# Patient Record
(demographics unavailable — no encounter records)

---

## 2024-10-17 NOTE — ASSESSMENT
[FreeTextEntry1] : A/P: ----------------------- *HFpEF s/p cardiomems, -euvolemic on exam -Cont. bumex, metoprolol, jardiance, fiorella  -no entresto has had hypotension in the past.  -will also see HF team next month. -per [facility] cardiomems numbers WNL. ----------------------- *severe MR - s/p clip 11/2023 with persistent severe MR -not a candidate for reintervention at this time. ----------------------- *afib -cont. rate control anticoag. ----------------------- Return in 2 months needs close followup.

## 2024-10-17 NOTE — HISTORY OF PRESENT ILLNESS
[FreeTextEntry1] : [id number]  YUDELKA STATON  Sep  1946 [phone]   76 y/o F with *severe MR s/p MItraClip x2 (23; 1 Xtw 1 XTr) with residual severe MR *severe TR, *iatrogenic ASD  *HFpEF s/p cardiomems, *AFib   here for followup. No dyspnea, chest pain, syncope/lightheadness. Taking fiorella 25 mg daily w/o problems SBPs 110s.  Reviewed meds: -fiorella 25 mg daily -bumex 2mg tabs, one tab daily. -metoprolol succ 25 tabs - 1/2 tab at night. -jardiance 10 mg daily -eliquis 5 mg BID -dig 125 mcg daily  ========================== ========================== CARDIAC TESTING:  EC24 - afib, HR 94, low limb voltage, nl RWP Echo: TTE 23: EF 60%, LVEDd 4.7, mitral clip with Severe MR,  Severe TR, severe pHTN (PASP 110), severe biatrial enlargement,  atrial septal defect (likely iatrogenic) with L to R flow, IVC 2.5 cm (not collapsible)  TTE 23: EF 60%, LVEDd 3.6 Severely dilated LA,  Severely dilated RA, RV normal structure and function,  ASD present, Severe MR Severe TR RVSP 66  LISE 10/10/23: LVEF 60-65%, severe LAE, mild RA,  PASP 44mmHg, severe MR degenerative leaflets  with posterior (P2 scallop) leaflet, flail gap approx. 7.9mm, flail width approx 13.8 mm.  Echo 10/5/23: LVEF 60-65%, LVEDD 5.86cm, IVSd 0.94cm, PWd 1.01, severe TR, severe MR, normal RV function. Cardiac Cath/PCI: [facility]S 23: RA 8, RV 53/7, Pa 54/24/35, PCWP .  Pa sat 75 CO 10.8 CI 5.7 (may be overestimated due to shunt) [facility] 23: RA 3, RV 30/1, PA 32/13 (20), PCWP 9, PA sat 64.4%, CO/CI 4.62/2.49, PVR 2.38. [facility] 23: non obstructive CAD. Cardiac Sur23: EDMUND w/ Mitraclip x 2 (1 Xtw 1 XTr) ========================== ==========================

## 2024-10-17 NOTE — HISTORY OF PRESENT ILLNESS
[FreeTextEntry1] : [id number]  YUDELKA STATON  Sep  1946 [phone]   78 y/o F with *severe MR s/p MItraClip x2 (23; 1 Xtw 1 XTr) with residual severe MR *severe TR, *iatrogenic ASD  *HFpEF s/p cardiomems, *AFib   here for followup. No dyspnea, chest pain, syncope/lightheadness. Taking fiorella 25 mg daily w/o problems SBPs 110s.  Reviewed meds: -fiorella 25 mg daily -bumex 2mg tabs, one tab daily. -metoprolol succ 25 tabs - 1/2 tab at night. -jardiance 10 mg daily -eliquis 5 mg BID -dig 125 mcg daily  ========================== ========================== CARDIAC TESTING:  EC24 - afib, HR 94, low limb voltage, nl RWP Echo: TTE 23: EF 60%, LVEDd 4.7, mitral clip with Severe MR,  Severe TR, severe pHTN (PASP 110), severe biatrial enlargement,  atrial septal defect (likely iatrogenic) with L to R flow, IVC 2.5 cm (not collapsible)  TTE 23: EF 60%, LVEDd 3.6 Severely dilated LA,  Severely dilated RA, RV normal structure and function,  ASD present, Severe MR Severe TR RVSP 66  LISE 10/10/23: LVEF 60-65%, severe LAE, mild RA,  PASP 44mmHg, severe MR degenerative leaflets  with posterior (P2 scallop) leaflet, flail gap approx. 7.9mm, flail width approx 13.8 mm.  Echo 10/5/23: LVEF 60-65%, LVEDD 5.86cm, IVSd 0.94cm, PWd 1.01, severe TR, severe MR, normal RV function. Cardiac Cath/PCI: RHC/cMEMS 23: RA 8, RV 53/7, Pa 54/24/35, PCWP .  Pa sat 75 CO 10.8 CI 5.7 (may be overestimated due to shunt) [facility] 23: RA 3, RV 30/1, PA 32/13 (20), PCWP 9, PA sat 64.4%, CO/CI 4.62/2.49, PVR 2.38. [facility] 23: non obstructive CAD. Cardiac Sur23: EDMUND w/ Mitraclip x 2 (1 Xtw 1 XTr) ========================== ==========================

## 2024-10-31 NOTE — ASSESSMENT
[FreeTextEntry1] : Pt here for evaluation of CKD  was recently started on entresto, BP went down to 80s and " kidney" did not tolerate Never had kidney imaging done, did not have renal arteries  evaluated *severe MR s/p MItraClip x2 (11/17/23; 1 Xtw 1 XTr) with residual severe MR *severe TR, *iatrogenic ASD *HFpEF s/p cardiomems, *AFib  BP 90s to 100 range Weight stable 144 range Not on ARB. ACEi  on Jardiance 10 mg po qd Bumex 2 mg qd, if weight increases > 147 lb takes extra Bumex  CKD III stable 1.3 range now was > 2 mg/dl Will need duplex renal arteries to r/o CAMERON due to low BP and HOWIE on entresto

## 2024-10-31 NOTE — HISTORY OF PRESENT ILLNESS
[FreeTextEntry1] : Pt here for evaluation of CKD  was recently started on entresto, BP went down to 80s and " kidney" did not tolerate Never had kidney imaging done

## 2024-10-31 NOTE — PHYSICAL EXAM
[General Appearance - Alert] : alert [General Appearance - In No Acute Distress] : in no acute distress [Sclera] : the sclera and conjunctiva were normal [PERRL With Normal Accommodation] : pupils were equal in size, round, and reactive to light [Extraocular Movements] : extraocular movements were intact [Outer Ear] : the ears and nose were normal in appearance [Oropharynx] : the oropharynx was normal [Neck Appearance] : the appearance of the neck was normal [Neck Cervical Mass (___cm)] : no neck mass was observed [Jugular Venous Distention Increased] : there was no jugular-venous distention [Thyroid Diffuse Enlargement] : the thyroid was not enlarged [Thyroid Nodule] : there were no palpable thyroid nodules [Auscultation Breath Sounds / Voice Sounds] : lungs were clear to auscultation bilaterally [Heart Rate And Rhythm] : heart rate was normal and rhythm regular [Heart Sounds] : normal S1 and S2 [Heart Sounds Gallop] : no gallops [Murmurs] : no murmurs [Heart Sounds Pericardial Friction Rub] : no pericardial rub [Full Pulse] : the pedal pulses are present [Edema] : there was no peripheral edema [Bowel Sounds] : normal bowel sounds [Abdomen Soft] : soft [Abdomen Tenderness] : non-tender [Abdomen Mass (___ Cm)] : no abdominal mass palpated [Skin Color & Pigmentation] : normal skin color and pigmentation [Skin Turgor] : normal skin turgor [] : no rash [Deep Tendon Reflexes (DTR)] : deep tendon reflexes were 2+ and symmetric [Sensation] : the sensory exam was normal to light touch and pinprick [No Focal Deficits] : no focal deficits [Oriented To Time, Place, And Person] : oriented to person, place, and time [Impaired Insight] : insight and judgment were intact [Affect] : the affect was normal

## 2024-11-27 NOTE — SOCIAL HISTORY
[TextEntry] : Denies tobacco use. Occasional EtOH use (takes shot at night 2-3 times/week for sleep assistance). Lives alone. Previously .

## 2024-11-27 NOTE — ASSESSMENT
[FreeTextEntry1] : 78 yo F with h/o HFpEF (EF 60%, LVEDd 4.7) s/p CardioMEMs, severe MR s/p MItraClip x2 (11/17/23; 1 Xtw 1 XTr) with residual severe MR, AFib (on AC), severe TR, iatrogenic ASD who presents for follow up. She is ACC/AHA Stage C endorsing NYHA class II symptoms and is near euvolemic on exam    1. HFpEF   - Continue Bumex 2 mg po daily  - Continue Spironolactone 25 mg po daily  - Continue Jardiance 10 mg po daily  - had been symptomatically hypotensive with entresto, will keep off for now  - will monitor PA pressure via cardiomems, goal likely 22-24 - repeat echo prior to next visit   2. severe MR - s/p clip 11/2023 with persistent severe MR - plan as above - per structural, no plan for repat intervention at this time  3. severe TR - Severe TR  - symptoms have improved but will monitor with echos   4. Afib - poorly rate controlled - on Eliquis 5 mg twice/day - on toprol 12.5 mg po at bedtime - on dig 0.125 mg QOD   RTC PA in 2 months, Dr. Stephen in 4 months

## 2024-11-27 NOTE — PHYSICAL EXAM
[No Rub] : no rub [No Gallop] : no gallop [Edema ___] : edema [unfilled] [Normal] : alert and oriented, normal memory [de-identified] : frail elderly  [de-identified] : JVP elevated to ~ 10 with v waves  [de-identified] : JERILYN present  [de-identified] : irreg irreg rhythm; grade III/VI systolic murmur

## 2024-11-27 NOTE — HISTORY OF PRESENT ILLNESS
[FreeTextEntry1] : 79 yo F with h/o HFpEF (EF 60%, LVEDd 4.7) s/p CardioMEMs, severe MR s/p MItraClip x2 (11/17/23; 1 Xtw 1 XTr) with residual severe MR, AFib (on AC), severe TR, iatrogenic ASD who presents for follow up.  Please see office note form 5/17/24 for full hpi  She was last seen on 8/23/24 where she was asymptomatic with elevated PAD pressures and bumex was increased for several days with improvement.  SHe has felt well since then, but cardiomems PAD readings have remained around 30 (goal approx 22-24).  She is going to cardiac rehab and has significantly increased her activity, no longer experiencing dyspnea.  Her weight has remained [id number] lbs and SBPs of . She takes an additional 2 mg po of bumex if her weight is above 147, which she has not needed to for the past month. She denies any syncope, LH/dizziness, chest pain, palpitations, PND, orthopnea, nausea/vomiting, abdominal pain. Denies bendopnea but does get lightheaded.

## 2024-11-27 NOTE — CARDIOLOGY SUMMARY
[de-identified] : 1/30/24 - afib, HR 94, low limb voltage, nl RWP [de-identified] : TTE 6/4/24:  EF 63%, LVIDD 5.8 LA severely dilated, RA severely dilated, Severe intervalvular MR, Severe TR, PASP 71 IVC 2.9  TTE 12/22/23: EF 60%, LVEDd 4.7, mitral clip with Severe MR, Severe TR, severe pHTN (PASP 110), severe biatrial enlargement, atrial septal defect (likely iatrogenic) with L to R flow, IVC 2.5 cm (not collapsible)  TTE 12/12/23: EF 60%, LVEDd 3.6 Severely dilated LA, Severely dilated RA, RV normal structure and function, ASD present, Severe MR Severe TR RVSP 66  LISE 10/10/23: LVEF 60-65%, severe LAE, mild RA, PASP 44mmHg, severe MR degenerative leaflets with posterior (P2 scallop) leaflet, flail gap approx. 7.9mm, flail width approx 13.8 mm.    Echo 10/5/23: LVEF 60-65%, LVEDD 5.86cm, IVSd 0.94cm, PWd 1.01, severe TR, severe MR, normal RV function.  [de-identified] : [facility] RA 8, RV 53/7, Pa 54/24/35, PCWP 18/19/16. Pa sat 75 CO 10.8 CI 5.7 (may be overestimated due to shunt) [facility] 8/24/23: RA 3, RV 30/1, PA 32/13 (20), PCWP 9, PA sat 64.4%, CO/CI 4.62/2.49, PVR 2.38. [facility] 8/24/23: non obstructive CAD.  [de-identified] : 11/17/23: EDMUND w/ Mitraclip x 2 (1 Xtw 1 XTr)

## 2024-12-18 NOTE — HISTORY OF PRESENT ILLNESS
[FreeTextEntry1] : [id number] YUDELKA STATON Sep 5 1946 [phone]  76 y/o F with *severe MR s/p MItraClip x2 (23; 1 Xtw 1 XTr) with residual severe MR *severe TR, *iatrogenic ASD  *HFpEF s/p cardiomems, *AFib  here for followup No dyspnea, no palpitations, syncope/lightheadness. no chest pain. Going to cardiac rehab HF recent started for her. Doing 1 hr of sessions.  Can walk up 2 flights at steady pace. Does ADLs & chores around house.  Wts stable 142-144.  Reviewed meds: -bumex 2 mg tabs, pt takes 1 tab daily if wt goes up above 147   pt takes an extra tablet - occurs once a month. -jardiance 10 mg daily -metoprolol succ 12.5 mg daily -fiorella 25 daily -crestor 20 mg daily -dig 125 mcg daily  doing cardiac rehab paying for individual sessions. Reviewed cardiomems data w/ [facility] HF -  PA pressures values elevated but no upward trend. 27 24 31 ========================== ========================== CARDIAC TESTING:  EC24 - afib, HR 94, low limb voltage, nl RWP Echo: TTE 23: EF 60%, LVEDd 4.7, mitral clip with Severe MR, Severe TR, severe pHTN (PASP 110), severe biatrial enlargement, atrial septal defect (likely iatrogenic) with L to R flow, IVC 2.5 cm (not collapsible)  TTE 23: EF 60%, LVEDd 3.6 Severely dilated LA, Severely dilated RA, RV normal structure and function, ASD present, Severe MR Severe TR RVSP 66  LISE 10/10/23: LVEF 60-65%, severe LAE, mild RA, PASP 44mmHg, severe MR degenerative leaflets with posterior (P2 scallop) leaflet, flail gap approx. 7.9mm, flail width approx 13.8 mm.  Echo 10/5/23: LVEF 60-65%, LVEDD 5.86cm, IVSd 0.94cm, PWd 1.01,  severe TR, severe MR, normal RV function. Cardiac Cath/PCI: [facility] 23: RA 8, RV 53/7, Pa 54/24/35, PCWP 18/19/16. Pa sat 75 CO 10.8 CI 5.7 (may be overestimated due to shunt) [facility] 23: RA 3, RV 30/1, PA 32/13 (20), PCWP 9, PA sat 64.4%, CO/CI 4.62/2.49, PVR 2.38. [facility] 23: non obstructive CAD. Cardiac Sur23: EDMUND w/ Mitraclip x 2 (1 Xtw 1 XTr)  BNPs: Sep '24: 2998 :  2229 : 3192 Mar '24: 3515 Sep '24: 1414  Cr/CGF: 1.32/ 41 Sep '24,  2.03/,  1.52/ 35  ========================== ==========================

## 2025-01-06 NOTE — ASSESSMENT
[FreeTextEntry1] : Pt here for evaluation of CKD  was recently started on entresto, BP went down to 80s and " kidney" did not tolerate Never had kidney imaging done  1/6 Feels improved on current meds  BNP elevated in Sept 2024 Takes Bumex with weight gain and sxs  severe MR s/p MItraClip x2 (11/17/23; 1 Xtw 1 XTr) with residual severe MR severe TR, iatrogenic ASD HFpEF s/p cardiomems, AFib  CKD III pt need to be kept in a slight negative balance, and elevated creat Pt able to monitor fluid balance, by sxs, weight and swelling of legs Renal duplex w/o any renal or renal artery stenosis Cont with the same meds Weight stable 146 lds on our office scale Creat 1.5 mg/dl which is stable at this hydration status no edema

## 2025-01-06 NOTE — HISTORY OF PRESENT ILLNESS
[FreeTextEntry1] : Pt here for evaluation of CKD  was recently started on entresto, BP went down to 80s and " kidney" did not tolerate Never had kidney imaging done  1/6 Feels improved on current meds  BNP elevated in Sept 2024 Takes Bumex with weight gain and sxs

## 2025-02-04 NOTE — PHYSICAL EXAM
[No Rub] : no rub [No Gallop] : no gallop [Edema ___] : edema [unfilled] [Normal] : alert and oriented, normal memory [No Edema] : no edema [de-identified] : frail elderly  [de-identified] : JVP elevated to ~ 8-10 with v waves  [de-identified] : JERILYN present  [de-identified] : irreg irreg rhythm; grade III/VI systolic murmur in L axilla

## 2025-02-04 NOTE — HISTORY OF PRESENT ILLNESS
[FreeTextEntry1] : Ms. Dye is a 77 yo F with h/o HFpEF (EF 60%, LVEDd 4.7) s/p CardioMEMs, severe MR s/p MItraClip x2 (11/17/23; 1 Xtw 1 XTr) with residual severe MR, AFib (on AC), severe TR, iatrogenic ASD who presents for follow up.  Please see office note form 5/17/24 for full iniitial details.   She was last seen on 11/22/24 where she was asymptomatic with elevated PAD pressures but is walking more than she has in a very long time without symptoms.  She is going to cardiac rehab and has significantly increased her activity, no longer experiencing dyspnea. Feels tired throughout the day with daytime naps. Her weight has remained [id number] lbs and SBPs of .   She takes an additional 2 mg po of bumex if her weight is above 147, which she has taken occasionally. She denies any syncope, LH/dizziness, chest pain, palpitations, PND, orthopnea, nausea/vomiting, abdominal pain. Denies bendopnea but does get lightheaded. Uses cane for stability.

## 2025-02-04 NOTE — ASSESSMENT
[FreeTextEntry1] : Ms. Dye is a 79 yo F with h/o HFpEF (EF 60%, LVEDd 4.7) s/p CardioMEMs, severe MR s/p MItraClip x2 (11/17/23; 1 Xtw 1 XTr) with residual severe MR, AFib (on AC), severe TR, iatrogenic ASD who presents for follow up. She is ACC/AHA Stage C endorsing NYHA class II symptoms and is near euvolemic on exam    1. HFpEF   - Continue Bumex 2 mg po daily  - Continue Spironolactone 25 mg po daily  - Continue Jardiance 10 mg po daily  - had been symptomatically hypotensive with entresto; will trial losartan 12.5 mg qhs - will monitor PA pressure via cardiomems, goal likely 22-24 - repeat echo prior to next visit   2. severe MR - s/p clip 11/2023 with persistent severe MR - plan as above - per structural, no plan for repat intervention at this time  3. severe TR - Severe TR  - symptoms have improved but will monitor with echos   4. Afib - poorly rate controlled - on Eliquis 5 mg twice/day - on toprol 12.5 mg po at bedtime - on dig 0.125 mg QOD   RTC PA in 2 and 4 months, me in 6 months

## 2025-02-04 NOTE — CARDIOLOGY SUMMARY
[de-identified] : 1/30/24 - afib, HR 94, low limb voltage, nl RWP [de-identified] : TTE 6/4/24:  EF 63%, LVIDD 5.8 LA severely dilated, RA severely dilated, Severe intervalvular MR, Severe TR, PASP 71 IVC 2.9  TTE 12/22/23: EF 60%, LVEDd 4.7, mitral clip with Severe MR, Severe TR, severe pHTN (PASP 110), severe biatrial enlargement, atrial septal defect (likely iatrogenic) with L to R flow, IVC 2.5 cm (not collapsible)  TTE 12/12/23: EF 60%, LVEDd 3.6 Severely dilated LA, Severely dilated RA, RV normal structure and function, ASD present, Severe MR Severe TR RVSP 66  LISE 10/10/23: LVEF 60-65%, severe LAE, mild RA, PASP 44mmHg, severe MR degenerative leaflets with posterior (P2 scallop) leaflet, flail gap approx. 7.9mm, flail width approx 13.8 mm.    Echo 10/5/23: LVEF 60-65%, LVEDD 5.86cm, IVSd 0.94cm, PWd 1.01, severe TR, severe MR, normal RV function.  [de-identified] [facility] RA 8, RV 53/7, Pa 54/24/35, PCWP 18/19/16. Pa sat 75 CO 10.8 CI 5.7 (may be overestimated due to shunt) [facility] 8/24/23: RA 3, RV 30/1, PA 32/13 (20), PCWP 9, PA sat 64.4%, CO/CI 4.62/2.49, PVR 2.38. [facility] 8/24/23: non obstructive CAD.  [de-identified] : 11/17/23: EDMUND w/ Mitraclip x 2 (1 Xtw 1 XTr)

## 2025-02-27 NOTE — HISTORY OF PRESENT ILLNESS
[FreeTextEntry1] : [id number]  YUDELKA STATON  Sep  5 1946 [phone]     76 y/o F with *severe MR s/p MItraClip x2 (11/17/23; 1 Xtw 1 XTr) with residual severe MR *severe TR, *iatrogenic ASD  *HFpEF s/p cardiomems, *AFib  BPs 90s-890s /60s at home. pt states she has to "work at getting BP up" went to cardiac rehab BPs 80s => drank fluid deep breathing this improves. increases to 96 so can do exercise but at end of exercise pt is quiviering during exercise not paying attention not feeling lightheaded after  last exericse almost passed out.  Started on losartan 12.5 mg qhs no dyspnea, no palptiations, no chest pain down to 143 feb 1st feb 2nd 144 147 yesterday 147 in past week not eating anyting   labs reviewed mid feb ordered by Dr. Stephen WNL A/P:  -stop losartan due to symtpomatic hyptoesnion -cont. other meds   Return May '25

## 2025-02-27 NOTE — HISTORY OF PRESENT ILLNESS
[FreeTextEntry1] : [id number]  YUDELKA STATON  Sep  5 1946 [phone]     76 y/o F with *severe MR s/p MItraClip x2 (11/17/23; 1 Xtw 1 XTr) with residual severe MR *severe TR, *iatrogenic ASD  *HFpEF s/p cardiomems, *AFib  BPs 90s-890s /60s at home. pt states she has to "work at getting BP up" went to cardiac rehab BPs 80s => drank fluid deep breathing this improves. increases to 96 so can do exercise but at end of exercise pt is quiviering during exercise not paying attention not feeling lightheaded after  last exericse almost passed out.  Started on losartan 12.5 mg qhs no dyspnea, no palptiations, no chest pain down to 143 feb 1st feb 2nd 144 147 yesterday 147 in past week not eating anyting   labs reviewed mid feb ordered by Dr. Stephen WNL A/P:  -stop losartan due to symtpomatic hyptoesnion -cont. other meds   Return May '25

## 2025-03-08 NOTE — HISTORY OF PRESENT ILLNESS
[FreeTextEntry1] : 75yo female wit CHF, HTN, HLD with new onset afib and severe MR recent hospital admission during which she underwent a cardiac catheterization. She had right CFA access for the procedure. Following procedure had right groin pain and bruising. Had a duplex that was concerning for PSA. Since discharge she has been doing well denies current groin pain, denies swelling in the RLE, denies foot numbness and pain  Interval History: Todat patient seen for follow up of AVF between right common femoral artery and common femoral vein with office duplex. She denies any worsening heart failure symptoms including SOB. She endorses being able to lay flat. She denies bilateral lower extremity swelling or bulging. States she has SOB when going up a flight of stairs which is not new. She denies pain bilaterally.  Interval History: Doing well no new leg complaints. Legs (bilateral) still with swelling. Not worsened but persistent. No claudication no worsening in symptoms. Compliant with all meds and follow ups. [de-identified] : 3/6/25: Presenting today for arterial duplex for AVF between right common femoral artery and common femoral vein following CFA access during cardiac catheterization. Patient states the swelling in her leg has significantly improved

## 2025-03-08 NOTE — HISTORY OF PRESENT ILLNESS
[FreeTextEntry1] : 77yo female wit CHF, HTN, HLD with new onset afib and severe MR recent hospital admission during which she underwent a cardiac catheterization. She had right CFA access for the procedure. Following procedure had right groin pain and bruising. Had a duplex that was concerning for PSA. Since discharge she has been doing well denies current groin pain, denies swelling in the RLE, denies foot numbness and pain  Interval History: Todat patient seen for follow up of AVF between right common femoral artery and common femoral vein with office duplex. She denies any worsening heart failure symptoms including SOB. She endorses being able to lay flat. She denies bilateral lower extremity swelling or bulging. States she has SOB when going up a flight of stairs which is not new. She denies pain bilaterally.  Interval History: Doing well no new leg complaints. Legs (bilateral) still with swelling. Not worsened but persistent. No claudication no worsening in symptoms. Compliant with all meds and follow ups. [de-identified] : 3/6/25: Presenting today for arterial duplex for AVF between right common femoral artery and common femoral vein following CFA access during cardiac catheterization. Patient states the swelling in her leg has significantly improved

## 2025-03-20 NOTE — HISTORY OF PRESENT ILLNESS
[FreeTextEntry1] : Pt here for evaluation of CKD  was recently started on entresto, BP went down to 80s and " kidney" did not tolerate Never had kidney imaging done  1/6 Feels improved on current meds  BNP elevated in Sept 2024 Takes Bumex with weight gain and sxs  3/20 Had SVT evaluated by Cardiology [facility] no complaints BP stable at home

## 2025-03-20 NOTE — ASSESSMENT
[FreeTextEntry1] : Pt here for evaluation of CKD  was recently started on entresto, BP went down to 80s and " kidney" did not tolerate Never had kidney imaging done  1/6 Feels improved on current meds  BNP elevated in Sept 2024 Takes Bumex with weight gain and sxs  3/20 Had SVT evaluated by Cardiology [facility] no complaints BP stable at home CKD III last Creat at  3/11 was 1.2 , possibly primary hyperparathyroidism Ca levels stable high 9 to 10 Pt declining endocrine eval If becomes problematic may get endocrine consult/ sistamibe scan  and if calcium further elevated may Rx cinacalcet  BP/ weights stable f/u in 6 months

## 2025-03-21 NOTE — PHYSICAL EXAM
[No Rub] : no rub [No Gallop] : no gallop [No Edema] : no edema [Normal] : alert and oriented, normal memory [de-identified] : frail elderly  [de-identified] : JVP elevated to ~ 8-10 with v waves  [de-identified] : JERILYN present  [de-identified] : irreg irreg rhythm; grade III/VI systolic murmur in L axilla

## 2025-03-21 NOTE — ASSESSMENT
[FreeTextEntry1] : Ms. Dye is a 77 yo F with h/o HFpEF (EF 60%, LVEDd 4.7) s/p CardioMEMs, severe MR s/p MItraClip x2 (11/17/23; 1 Xtw 1 XTr) with residual severe MR, AFib (on AC), severe TR, iatrogenic ASD who presents for follow up. She is ACC/AHA Stage C endorsing NYHA class II symptoms and is near euvolemic on exam    1. HFpEF   - Continue Bumex 2 mg po daily  - Continue Spironolactone 25 mg po daily  - Continue Jardiance 10 mg po daily  - had been symptomatically hypotensive with entresto and losartan  - will monitor PA pressure via cardiomems, goal likely 22-24   2. severe MR - s/p clip 11/2023 with persistent severe MR - plan as above - per structural, no plan for repat intervention at this time  3. severe TR - Severe TR  - symptoms have improved but will monitor with echos   4. Afib - poorly rate controlled - on Eliquis 5 mg twice/day - on toprol 12.5 mg po at bedtime - on dig 0.125 mg QOD   5. VT - S/p ICD   RTC PA in 2 months,  Dr. Stephen

## 2025-03-21 NOTE — ASSESSMENT
[FreeTextEntry1] : Today ILR reveals she is in Afib, rates controlled. She is maintained on Eliquis, Digoxin, Toprol XL12.5mg daily. No episodes of NSVT or any other recordings noted. Remote is transmitting.

## 2025-03-21 NOTE — ASSESSMENT
[FreeTextEntry1] : A/P:  -exercise stress will be scheduled next week to confirm no significant arrhythmias. after than will resume cardiac rehab.

## 2025-03-21 NOTE — HISTORY OF PRESENT ILLNESS
[FreeTextEntry1] : Ms. Dye is a 77 yo F with h/o HFpEF (EF 60%, LVEDd 4.7) s/p CardioMEMs, severe MR s/p MItraClip x2 (11/17/23; 1 Xtw 1 XTr) with residual severe MR, AFib (on AC), severe TR, iatrogenic ASD who presents for follow up.  Please see office note form 5/17/24 for full iniitial details.   She was last seen on 2/4/25 feeling well, was started on losartan for afterload reduction.  Unfortunately this decreased her BPs to 80s with lightheadedness.  She was admitted to  2 weeks ago after an episode of 30 bts of vt while at cardiac rehab, requiring ICD placement. She has been asymptotic since and is schedule to undergo a stress test today. PAD has remained elevated but her activity in increasing daily.   Her weight has remained [id number] lbs and SBPs of .   She takes an additional 2 mg po of bumex if her weight is above 147, which she has taken occasionally. She denies any syncope, LH/dizziness, chest pain, palpitations, PND, orthopnea, nausea/vomiting, abdominal pain. Denies bendopnea but does get lightheaded. Uses cane for stability.

## 2025-03-21 NOTE — HISTORY OF PRESENT ILLNESS
[FreeTextEntry1] : 76 y/o F with  *NSVT-30 beats-s/p ILR Mar '25 *severe MR s/p MItraClip x2 (23; 1 Xtw 1 XTr) with residual severe MR *severe TR, *iatrogenic ASD   *HFpEF s/p cardiomems, *AFib  here for followup. last visit  losartan stopped for symptomatic hypotension.  Since last visit had 30 beats of NSVt mildly symptomatic at cardiac rehab sent to ED cath w/ normal cors ILR implanted see below for records  Reviewed case w/ Dr. Makeda PIEDRA. nonsustained VT run of 30 beats was not considered to be related to exercise as pt was doing minimal exertion at time of event.    ========================================== PRIOR CARDIAC TESTING:  EC24 - afib, HR 94, low limb voltage, nl RWP Echo: TTE 24: EF 63%, LVIDD 5.8 LA severely dilated, RA severely dilated, Severe intervalvular MR, Severe TR, PASP 71 IVC 2.9  TTE 23: EF 60%, LVEDd 4.7, mitral clip with Severe MR, Severe TR, severe pHTN (PASP 110), severe biatrial enlargement, atrial septal defect (likely iatrogenic) with L to R flow, IVC 2.5 cm (not collapsible)  TTE 23: EF 60%, LVEDd 3.6 Severely dilated LA, Severely dilated RA, RV normal structure and function, ASD present, Severe MR Severe TR RVSP 66  LISE 10/10/23: LVEF 60-65%, severe LAE, mild RA, PASP 44mmHg, severe MR degenerative leaflets with posterior (P2 scallop) leaflet, flail gap approx. 7.9mm, flail width approx 13.8 mm.  Echo 10/5/23: LVEF 60-65%, LVEDD 5.86cm, IVSd 0.94cm, PWd 1.01, severe TR, severe MR, normal RV function.  Cardiac Cath/PCI: RHC/cMEMS 23: RA 8, RV 53/7, Pa 54/24/35,  PCWP 18/19/16. Pa sat 75 CO 10.8 CI 5.7 (may be overestimated due to shunt) [facility] 23: RA 3, RV 30/1, PA 32/13 (20), PCWP 9, PA sat 64.4%, CO/CI 4.62/2.49, PVR 2.38.  [facility] 23: non obstructive CAD. *********Cath 03/10/2025: normal coronaries,  hypercontractile LV w/ severe MR  Cardiac Sur23: EDMUND w/ Mitraclip x 2 (1 Xtw 1 XTr) ========================================= =========================================  Hospital Course: Discharge Date 11-Mar-2025 Admission Date 07-Mar-2025 16:19 Hospital Course .................................... 76 y/o F with HFpEF, severe MR, AFib now s/p EDMUND with MitraClip on 23, CKD. Presenting to  for 30 beats of vtach. Pt states she was at cardiac rehab exercising, pt felt SOB and states vtach was detected at this time. PT has no other symptoms denies chest pain, fevers, chills. In the ED pt reports that she felt dizziness and presyncope during rehab episode however she denies these symptoms with me . VSS, LAbs WNL. Admitted to Tele Assessment/Plan:  # Nonsustained Vtach  - Tele - Recent TTE : LVEF 70%, severely dilated RA and LA, severe MR s/p mitral clip, severe TR, severe pHTN, PFO with left to right shunt - [facility] 3/10 - cw BB - EP and cardio consulted neg cath does not qualify for AICD as NSVT was for only 15 sec ( needs more than 30 sec) s/p ILR 3/11/25  #HFpEF and valvular heat disease  - Daily weights I&Os - GDMT, holding entresto as patient states this was held by Dr Mendoza normal Dig level  # CKD: increased cr levels held bumex, dig; reume upon discharge  # Chronic Afib - cont eliquis on BB  Code status: Full  d/c home  time spent 45 min  Med Reconciliation: Medication Reconciliation Status Admission Reconciliation is Completed Discharge Reconciliation is Completed Discharge Medications   *apixaban 5 mg oral tablet: 1 tab(s) orally every 12 hours *bumetanide 2 mg oral tablet: 2 tab(s) orally once a day Centrum Silver Women's oral tablet: 1 tab(s) orally once a day *digoxin 125 mcg (0.125 mg) oral tablet: 1 tab(s) orally every other day *** M, W, F*** *ferrous sulfate 325 mg (65 mg elemental iron) oral tablet: 1 tab(s) orally once a day *Metoprolol Succinate ER 25 mg oral tablet, extended release: 0.5 tab(s) orally once a day (at bedtime) *rosuvastatin 20 mg oral tablet: 1 tab(s) orally once a day *sacubitril-valsartan 24 mg-26 mg oral tablet: 0.5 tab(s) orally 2 times a day *spironolactone 25 mg oral tablet: 1 tab(s) orally once a day ....................................  Electronic Signatures: Oscar Barrett) (Signed 11-Mar-2025 15:54) ========================================= ========================================= Progress Note: - Provider Specialty Electrophysiology  Reason for Admission: Reason for Admission: - Reason for Admission VT at Cardiac Rehab   - Subjective and Objective: 76 y/o F with HFpEF, severe MR, AFib now s/p EDMUND with MitraClip on 23, CKD. Presenting to  for 30 beats of vtach. Pt states she was at cardiac rehab exercising, pt felt SOB and states vtach was detected at this time. PT has no other symptoms denies chest pain, fevers, chills. In the ED pt reports that she felt dizziness and presyncope during rehab episode however she denies these symptoms with me . VSS, LAbs WNL. Admitted to [facility] < from: TTE W or WO Ultrasound Enhancing Agent (25 @ 12:55) >  CONCLUSIONS:  1. Left ventricular systolic function is normal with an ejection fractionof 70 % by Wiggins's method of disks. 2. Left atrium is severely dilated. 3. The right atrium is severely dilated. 4. Severe mitral regurgitation. 5. Patent foramen ovale with left to right shunting by color flow Doppler. 6. Severe tricuspid regurgitation. 7. Estimated pulmonary artery systolic pressure is 80 mmHg, consistent with severe pulmonary hypertension. 8. Percutaneous [id number] mitral repair device with a Mitraclip.  Assessment and Plan: - Assessment 77 year old female withnon-sustainedVT at Cardiac Rehab. LVEF is 70% with severe MR and TR Pat Med Hx of: HFpEF, severe MR, AFib now s/p EDMUND with MitraClip on 23, CKD. Pt to have LHC today. If cath is negative will proceed for ICD for secondary prevention.  Will make pt NPO Type and Screen today NPO after midnight Will hold Eliquis if the plan is to proceed with ICD, if no cardiac interventions  Electronic Signatures: Magaly Barrera (NP) (Signed 11-Mar-2025 10:09) ========================================= ========================================= Authored: Progress Note, Reason for Admission, Subjective and Objective, Assessment and Plan  Note Type Electrophysiology   Pt had LHC that showed nonobstructive disease and recommended medial management. Pt remains in SR. AT home was in Torpol 12.5mg daily. Tel shows heart rate averaging 60 BPM over the the last 24 hours. /71 I received the entire strips for [facility]-VT totalled 15 seconds. Pt doesn't meet the requirements for ICD, will continue with her current dose of Toprol 12.5 mgs daily Will implant ILR in the am.   Electronic Signatures: Magaly Barrera (RONALD) (Signed 10-Mar-2025 17:28) Authored: Note Type, Note   Last Updated: 10-Mar-2025 17:28 by Magaly Barrera (NP) ========================================= ========================================= Cath Lab Report  Study Date:03/10/2025 Name:YUDELKA STATON Diagnostic Conclusions:No coronary disease.  Hypercontractile LV with severe MR LM, LAD LCx RCA  no disease. ========================================= =========================================

## 2025-03-21 NOTE — HISTORY OF PRESENT ILLNESS
[FreeTextEntry1] : This is a 78 yr old female with PMHx of HFpEF, severe MR s/p Beverly clip with residual severe MR, severe TR, also has Cardiomems, iatrogenic ASD, PAF on OAC and Digoxin who had episode of VT 30 beats during cardiac rehab a few weeks ago with symptoms of lightheadedness, SOB, and near syncope.  She was admitted to  and underwent LHC/RHC, normal cors.  She had ILR implanted for further rhythm surveillance.

## 2025-03-21 NOTE — HISTORY OF PRESENT ILLNESS
[FreeTextEntry1] : 76 y/o F with  *NSVT-30 beats-s/p ILR Mar '25 *severe MR s/p MItraClip x2 (23; 1 Xtw 1 XTr) with residual severe MR *severe TR, *iatrogenic ASD   *HFpEF s/p cardiomems, *AFib  here for followup. last visit  losartan stopped for symptomatic hypotension.  Since last visit had 30 beats of NSVt mildly symptomatic at cardiac rehab sent to ED cath w/ normal cors ILR implanted see below for records  Reviewed case w/ Dr. Makeda PIEDRA. nonsustained VT run of 30 beats was not considered to be related to exercise as pt was doing minimal exertion at time of event.    ========================================== PRIOR CARDIAC TESTING:  EC24 - afib, HR 94, low limb voltage, nl RWP Echo: TTE 24: EF 63%, LVIDD 5.8 LA severely dilated, RA severely dilated, Severe intervalvular MR, Severe TR, PASP 71 IVC 2.9  TTE 23: EF 60%, LVEDd 4.7, mitral clip with Severe MR, Severe TR, severe pHTN (PASP 110), severe biatrial enlargement, atrial septal defect (likely iatrogenic) with L to R flow, IVC 2.5 cm (not collapsible)  TTE 23: EF 60%, LVEDd 3.6 Severely dilated LA, Severely dilated RA, RV normal structure and function, ASD present, Severe MR Severe TR RVSP 66  LISE 10/10/23: LVEF 60-65%, severe LAE, mild RA, PASP 44mmHg, severe MR degenerative leaflets with posterior (P2 scallop) leaflet, flail gap approx. 7.9mm, flail width approx 13.8 mm.  Echo 10/5/23: LVEF 60-65%, LVEDD 5.86cm, IVSd 0.94cm, PWd 1.01, severe TR, severe MR, normal RV function.  Cardiac Cath/PCI: RHC/cMEMS 23: RA 8, RV 53/7, Pa 54/24/35,  PCWP 18/19/16. Pa sat 75 CO 10.8 CI 5.7 (may be overestimated due to shunt) [facility] 23: RA 3, RV 30/1, PA 32/13 (20), PCWP 9, PA sat 64.4%, CO/CI 4.62/2.49, PVR 2.38.  [facility] 23: non obstructive CAD. *********Cath 03/10/2025: normal coronaries,  hypercontractile LV w/ severe MR  Cardiac Sur23: EDMUND w/ Mitraclip x 2 (1 Xtw 1 XTr) ========================================= =========================================  Hospital Course: Discharge Date 11-Mar-2025 Admission Date 07-Mar-2025 16:19 Hospital Course .................................... 76 y/o F with HFpEF, severe MR, AFib now s/p EDMUND with MitraClip on 23, CKD. Presenting to  for 30 beats of vtach. Pt states she was at cardiac rehab exercising, pt felt SOB and states vtach was detected at this time. PT has no other symptoms denies chest pain, fevers, chills. In the ED pt reports that she felt dizziness and presyncope during rehab episode however she denies these symptoms with me . VSS, LAbs WNL. Admitted to Tele Assessment/Plan:  # Nonsustained Vtach  - Tele - Recent TTE : LVEF 70%, severely dilated RA and LA, severe MR s/p mitral clip, severe TR, severe pHTN, PFO with left to right shunt - [facility] 3/10 - cw BB - EP and cardio consulted neg cath does not qualify for AICD as NSVT was for only 15 sec ( needs more than 30 sec) s/p ILR 3/11/25  #HFpEF and valvular heat disease  - Daily weights I&Os - GDMT, holding entresto as patient states this was held by Dr Mendoza normal Dig level  # CKD: increased cr levels held bumex, dig; reume upon discharge  # Chronic Afib - cont eliquis on BB  Code status: Full  d/c home  time spent 45 min  Med Reconciliation: Medication Reconciliation Status Admission Reconciliation is Completed Discharge Reconciliation is Completed Discharge Medications   *apixaban 5 mg oral tablet: 1 tab(s) orally every 12 hours *bumetanide 2 mg oral tablet: 2 tab(s) orally once a day Centrum Silver Women's oral tablet: 1 tab(s) orally once a day *digoxin 125 mcg (0.125 mg) oral tablet: 1 tab(s) orally every other day *** M, W, F*** *ferrous sulfate 325 mg (65 mg elemental iron) oral tablet: 1 tab(s) orally once a day *Metoprolol Succinate ER 25 mg oral tablet, extended release: 0.5 tab(s) orally once a day (at bedtime) *rosuvastatin 20 mg oral tablet: 1 tab(s) orally once a day *sacubitril-valsartan 24 mg-26 mg oral tablet: 0.5 tab(s) orally 2 times a day *spironolactone 25 mg oral tablet: 1 tab(s) orally once a day ....................................  Electronic Signatures: Oscar Barrett) (Signed 11-Mar-2025 15:54) ========================================= ========================================= Progress Note: - Provider Specialty Electrophysiology  Reason for Admission: Reason for Admission: - Reason for Admission VT at Cardiac Rehab   - Subjective and Objective: 76 y/o F with HFpEF, severe MR, AFib now s/p EDMUND with MitraClip on 23, CKD. Presenting to  for 30 beats of vtach. Pt states she was at cardiac rehab exercising, pt felt SOB and states vtach was detected at this time. PT has no other symptoms denies chest pain, fevers, chills. In the ED pt reports that she felt dizziness and presyncope during rehab episode however she denies these symptoms with me . VSS, LAbs WNL. Admitted to [facility] < from: TTE W or WO Ultrasound Enhancing Agent (25 @ 12:55) >  CONCLUSIONS:  1. Left ventricular systolic function is normal with an ejection fractionof 70 % by Wiggins's method of disks. 2. Left atrium is severely dilated. 3. The right atrium is severely dilated. 4. Severe mitral regurgitation. 5. Patent foramen ovale with left to right shunting by color flow Doppler. 6. Severe tricuspid regurgitation. 7. Estimated pulmonary artery systolic pressure is 80 mmHg, consistent with severe pulmonary hypertension. 8. Percutaneous [id number] mitral repair device with a Mitraclip.  Assessment and Plan: - Assessment 77 year old female withnon-sustainedVT at Cardiac Rehab. LVEF is 70% with severe MR and TR Pat Med Hx of: HFpEF, severe MR, AFib now s/p EDMUND with MitraClip on 23, CKD. Pt to have LHC today. If cath is negative will proceed for ICD for secondary prevention.  Will make pt NPO Type and Screen today NPO after midnight Will hold Eliquis if the plan is to proceed with ICD, if no cardiac interventions  Electronic Signatures: Magaly Barrera (NP) (Signed 11-Mar-2025 10:09) ========================================= ========================================= Authored: Progress Note, Reason for Admission, Subjective and Objective, Assessment and Plan  Note Type Electrophysiology   Pt had LHC that showed nonobstructive disease and recommended medial management. Pt remains in SR. AT home was in Torpol 12.5mg daily. Tel shows heart rate averaging 60 BPM over the the last 24 hours. /71 I received the entire strips for [facility]-VT totalled 15 seconds. Pt doesn't meet the requirements for ICD, will continue with her current dose of Toprol 12.5 mgs daily Will implant ILR in the am.   Electronic Signatures: Magaly Barrera (RONALD) (Signed 10-Mar-2025 17:28) Authored: Note Type, Note   Last Updated: 10-Mar-2025 17:28 by Magaly Barrera (NP) ========================================= ========================================= Cath Lab Report  Study Date:03/10/2025 Name:YUDELKA STATON Diagnostic Conclusions:No coronary disease.  Hypercontractile LV with severe MR LM, LAD LCx RCA  no disease. ========================================= =========================================

## 2025-03-21 NOTE — HISTORY OF PRESENT ILLNESS
[FreeTextEntry1] : 76 y/o F with  *NSVT-30 beats-s/p ILR Mar '25 *severe MR s/p MItraClip x2 (23; 1 Xtw 1 XTr) with residual severe MR *severe TR, *iatrogenic ASD   *HFpEF s/p cardiomems, *AFib  here for followup. last visit  losartan stopped for symptomatic hypotension.  Since last visit had 30 beats of NSVt mildly symptomatic at cardiac rehab sent to ED cath w/ normal cors ILR implanted see below for records  Reviewed case w/ Dr. Makeda PIEDRA. nonsustained VT run of 30 beats was not considered to be related to exercise as pt was doing minimal exertion at time of event.    ========================================== PRIOR CARDIAC TESTING:  EC24 - afib, HR 94, low limb voltage, nl RWP Echo: TTE 24: EF 63%, LVIDD 5.8 LA severely dilated, RA severely dilated, Severe intervalvular MR, Severe TR, PASP 71 IVC 2.9  TTE 23: EF 60%, LVEDd 4.7, mitral clip with Severe MR, Severe TR, severe pHTN (PASP 110), severe biatrial enlargement, atrial septal defect (likely iatrogenic) with L to R flow, IVC 2.5 cm (not collapsible)  TTE 23: EF 60%, LVEDd 3.6 Severely dilated LA, Severely dilated RA, RV normal structure and function, ASD present, Severe MR Severe TR RVSP 66  LISE 10/10/23: LVEF 60-65%, severe LAE, mild RA, PASP 44mmHg, severe MR degenerative leaflets with posterior (P2 scallop) leaflet, flail gap approx. 7.9mm, flail width approx 13.8 mm.  Echo 10/5/23: LVEF 60-65%, LVEDD 5.86cm, IVSd 0.94cm, PWd 1.01, severe TR, severe MR, normal RV function.  Cardiac Cath/PCI: [facility] 23: RA 8, RV 53/7, Pa 54/24/35,  PCWP 18/19/16. Pa sat 75 CO 10.8 CI 5.7 (may be overestimated due to shunt) [facility] 23: RA 3, RV 30/1, PA 32/13 (20), PCWP 9, PA sat 64.4%, CO/CI 4.62/2.49, PVR 2.38.  [facility] 23: non obstructive CAD. *********Cath 03/10/2025: normal coronaries,  hypercontractile LV w/ severe MR  Cardiac Sur23: EDMUND w/ Mitraclip x 2 (1 Xtw 1 XTr) ========================================= =========================================  Hospital Course: Discharge Date 11-Mar-2025 Admission Date 07-Mar-2025 16:19 Hospital Course .................................... 76 y/o F with HFpEF, severe MR, AFib now s/p EDMUND with MitraClip on 23, CKD. Presenting to  for 30 beats of vtach. Pt states she was at cardiac rehab exercising, pt felt SOB and states vtach was detected at this time. PT has no other symptoms denies chest pain, fevers, chills. In the ED pt reports that she felt dizziness and presyncope during rehab episode however she denies these symptoms with me . VSS, LAbs WNL. Admitted to Tele Assessment/Plan:  # Nonsustained Vtach  - Tele - Recent TTE : LVEF 70%, severely dilated RA and LA, severe MR s/p mitral clip, severe TR, severe pHTN, PFO with left to right shunt - [facility] 3/10 - cw BB - EP and cardio consulted neg cath does not qualify for AICD as NSVT was for only 15 sec ( needs more than 30 sec) s/p ILR 3/11/25  #HFpEF and valvular heat disease  - Daily weights I&Os - GDMT, holding entresto as patient states this was held by Dr Mendoza normal Dig level  # CKD: increased cr levels held bumex, dig; reume upon discharge  # Chronic Afib - cont eliquis on BB  Code status: Full  d/c home  time spent 45 min  Med Reconciliation: Medication Reconciliation Status Admission Reconciliation is Completed Discharge Reconciliation is Completed Discharge Medications   *apixaban 5 mg oral tablet: 1 tab(s) orally every 12 hours *bumetanide 2 mg oral tablet: 2 tab(s) orally once a day Centrum Silver Women's oral tablet: 1 tab(s) orally once a day *digoxin 125 mcg (0.125 mg) oral tablet: 1 tab(s) orally every other day *** M, W, F*** *ferrous sulfate 325 mg (65 mg elemental iron) oral tablet: 1 tab(s) orally once a day *Metoprolol Succinate ER 25 mg oral tablet, extended release: 0.5 tab(s) orally once a day (at bedtime) *rosuvastatin 20 mg oral tablet: 1 tab(s) orally once a day *sacubitril-valsartan 24 mg-26 mg oral tablet: 0.5 tab(s) orally 2 times a day *spironolactone 25 mg oral tablet: 1 tab(s) orally once a day ....................................  Electronic Signatures: Oscar Barrett) (Signed 11-Mar-2025 15:54) ========================================= ========================================= Progress Note: - Provider Specialty Electrophysiology  Reason for Admission: Reason for Admission: - Reason for Admission VT at Cardiac Rehab   - Subjective and Objective: 76 y/o F with HFpEF, severe MR, AFib now s/p EDMUND with MitraClip on 23, CKD. Presenting to  for 30 beats of vtach. Pt states she was at cardiac rehab exercising, pt felt SOB and states vtach was detected at this time. PT has no other symptoms denies chest pain, fevers, chills. In the ED pt reports that she felt dizziness and presyncope during rehab episode however she denies these symptoms with me . VSS, LAbs WNL. Admitted to [facility] < from: TTE W or WO Ultrasound Enhancing Agent (25 @ 12:55) >  CONCLUSIONS:  1. Left ventricular systolic function is normal with an ejection fractionof 70 % by Wiggins's method of disks. 2. Left atrium is severely dilated. 3. The right atrium is severely dilated. 4. Severe mitral regurgitation. 5. Patent foramen ovale with left to right shunting by color flow Doppler. 6. Severe tricuspid regurgitation. 7. Estimated pulmonary artery systolic pressure is 80 mmHg, consistent with severe pulmonary hypertension. 8. Percutaneous [id number] mitral repair device with a Mitraclip.  Assessment and Plan: - Assessment 77 year old female withnon-sustainedVT at Cardiac Rehab. LVEF is 70% with severe MR and TR Pat Med Hx of: HFpEF, severe MR, AFib now s/p EDMUND with MitraClip on 23, CKD. Pt to have LHC today. If cath is negative will proceed for ICD for secondary prevention.  Will make pt NPO Type and Screen today NPO after midnight Will hold Eliquis if the plan is to proceed with ICD, if no cardiac interventions  Electronic Signatures: Magaly Barrera (NP) (Signed 11-Mar-2025 10:09) ========================================= ========================================= Authored: Progress Note, Reason for Admission, Subjective and Objective, Assessment and Plan  Note Type Electrophysiology   Pt had LHC that showed nonobstructive disease and recommended medial management. Pt remains in SR. AT home was in Torpol 12.5mg daily. Tel shows heart rate averaging 60 BPM over the the last 24 hours. /71 I received the entire strips for [facility]-VT totalled 15 seconds. Pt doesn't meet the requirements for ICD, will continue with her current dose of Toprol 12.5 mgs daily Will implant ILR in the am.   Electronic Signatures: Magaly Barrera (RONALD) (Signed 10-Mar-2025 17:28) Authored: Note Type, Note   Last Updated: 10-Mar-2025 17:28 by Magaly Barrera (NP) ========================================= ========================================= Cath Lab Report  Study Date:03/10/2025 Name:YUDELKA STATON Diagnostic Conclusions:No coronary disease.  Hypercontractile LV with severe MR LM, LAD LCx RCA  no disease. ========================================= =========================================

## 2025-03-21 NOTE — CARDIOLOGY SUMMARY
[de-identified] : 1/30/24 - afib, HR 94, low limb voltage, nl RWP [de-identified] : TTE 3/4/25:  EF 70%, severe MR, IVC 1.4   TTE 6/4/24:  EF 63%, LVIDD 5.8 LA severely dilated, RA severely dilated, Severe intervalvular MR, Severe TR, PASP 71 IVC 2.9  TTE 12/22/23: EF 60%, LVEDd 4.7, mitral clip with Severe MR, Severe TR, severe pHTN (PASP 110), severe biatrial enlargement, atrial septal defect (likely iatrogenic) with L to R flow, IVC 2.5 cm (not collapsible)  TTE 12/12/23: EF 60%, LVEDd 3.6 Severely dilated LA, Severely dilated RA, RV normal structure and function, ASD present, Severe MR Severe TR RVSP 66  LISE 10/10/23: LVEF 60-65%, severe LAE, mild RA, PASP 44mmHg, severe MR degenerative leaflets with posterior (P2 scallop) leaflet, flail gap approx. 7.9mm, flail width approx 13.8 mm.    Echo 10/5/23: LVEF 60-65%, LVEDD 5.86cm, IVSd 0.94cm, PWd 1.01, severe TR, severe MR, normal RV function.  [de-identified] : [facility] RA 8, RV 53/7, Pa 54/24/35, PCWP 18/19/16. Pa sat 75 CO 10.8 CI 5.7 (may be overestimated due to shunt) [facility] 8/24/23: RA 3, RV 30/1, PA 32/13 (20), PCWP 9, PA sat 64.4%, CO/CI 4.62/2.49, PVR 2.38. [facility] 8/24/23: non obstructive CAD.  [de-identified] : 11/17/23: EDMUND w/ Mitraclip x 2 (1 Xtw 1 XTr)

## 2025-03-21 NOTE — PHYSICAL EXAM
[Normal] : moves all extremities, no focal deficits, normal speech [de-identified] : ILR site is closed and healed.

## 2025-03-21 NOTE — REASON FOR VISIT
[Symptom and Test Evaluation] : symptom and test evaluation [FreeTextEntry1] : Patient presents for post op ILR visit.

## 2025-04-03 NOTE — ASSESSMENT
[FreeTextEntry1] : A/P:  Cont. current meds.   call rehab make sure they got form. Return in 2 months.

## 2025-04-03 NOTE — HISTORY OF PRESENT ILLNESS
[FreeTextEntry1] : [id number] YUDELKA STATON Sep 1946 [phone]  76 y/o F with  *NSVT-30 beats-s/p ILR Mar '25 *severe MR s/p MItraClip x2 (23; 1 Xtw 1 XTr) with residual severe MR *severe TR, *iatrogenic ASD   *HFpEF s/p cardiomems, *AFib  here for followup. no new symtoms hasn't started cardiac rehab yet no return of symptos she had when she was rehab.  Shweta Garcia 4 hours when she was younger.  bumex 2 mg daily dig 0.125  daily eliquis 5 BID jardiance 10 mg daily metoprolol suc ER 25 daily  crestor 20 qhs fiorella 25 dliay.  ========================================== PRIOR CARDIAC TESTING:  EC24 - afib, HR 94, low limb voltage, nl RWP Echo: TTE 24: EF 63%, LVIDD 5.8 LA severely dilated, RA severely dilated, Severe intervalvular MR, Severe TR, PASP 71 IVC 2.9  TTE 23: EF 60%, LVEDd 4.7, mitral clip with Severe MR, Severe TR, severe pHTN (PASP 110), severe biatrial enlargement, atrial septal defect (likely iatrogenic) with L to R flow, IVC 2.5 cm (not collapsible)  TTE 23: EF 60%, LVEDd 3.6 Severely dilated LA, Severely dilated RA, RV normal structure and function, ASD present, Severe MR Severe TR RVSP 66  LISE 10/10/23: LVEF 60-65%, severe LAE, mild RA, PASP 44mmHg, severe MR degenerative leaflets with posterior (P2 scallop) leaflet, flail gap approx. 7.9mm, flail width approx 13.8 mm.  Echo 10/5/23: LVEF 60-65%, LVEDD 5.86cm, IVSd 0.94cm, PWd 1.01, severe TR, severe MR, normal RV function.  Cardiac Cath/PCI: [facility] 23: RA 8, RV 53/7, Pa 54/24/35,  PCWP 18/19/16. Pa sat 75 CO 10.8 CI 5.7 (may be overestimated due to shunt) [facility] 23: RA 3, RV 30/1, PA 32/13 (20), PCWP 9, PA sat 64.4%, CO/CI 4.62/2.49, PVR 2.38.  [facility] 23: non obstructive CAD.  *****Cath 03/10/2025: normal coronaries, hypercontractile LV w/ severe MR  Cardiac Sur23: EDMUND w/ Mitraclip x 2 (1 Xtw 1 XTr) ========================================= =========================================

## 2025-04-30 NOTE — ASSESSMENT
[FreeTextEntry1] : Ms. Dye is a 79 yo F with h/o HFpEF (EF 60%, LVEDd 4.7) s/p CardioMEMs, severe MR s/p MItraClip x2 (11/17/23; 1 Xtw 1 XTr) with residual severe MR, AFib (on AC), severe TR, iatrogenic ASD who presents for follow up. She is ACC/AHA Stage C endorsing NYHA class II symptoms and is near euvolemic on exam    1. HFpEF   - Continue Bumex 2 mg po daily  - Continue Spironolactone 25 mg po daily  - Continue Jardiance 10 mg po daily  - had been symptomatically hypotensive with entresto and losartan  - will monitor PA pressure via cardiomems, goal likely 22-24   2. severe MR - s/p clip 11/2023 with persistent severe MR - plan as above - per structural, no plan for repat intervention at this time  3. severe TR - Severe TR  - symptoms have improved but will monitor with echos   4. Afib - poorly rate controlled - on Eliquis 5 mg twice/day - on toprol 12.5 mg po at bedtime - on dig 0.125 mg QOD   5. VT - S/p ICD   RTC PA in 2 months,  Dr. Stephen TBD

## 2025-04-30 NOTE — CARDIOLOGY SUMMARY
[de-identified] : 1/30/24 - afib, HR 94, low limb voltage, nl RWP [de-identified] : TTE 3/4/25:  EF 70%, severe MR, severe TR PASP 80 IVC 1.4   TTE 6/4/24:  EF 63%, LVIDD 5.8 LA severely dilated, RA severely dilated, Severe intervalvular MR, Severe TR, PASP 71 IVC 2.9  TTE 12/22/23: EF 60%, LVEDd 4.7, mitral clip with Severe MR, Severe TR, severe pHTN (PASP 110), severe biatrial enlargement, atrial septal defect (likely iatrogenic) with L to R flow, IVC 2.5 cm (not collapsible)  TTE 12/12/23: EF 60%, LVEDd 3.6 Severely dilated LA, Severely dilated RA, RV normal structure and function, ASD present, Severe MR Severe TR RVSP 66  LISE 10/10/23: LVEF 60-65%, severe LAE, mild RA, PASP 44mmHg, severe MR degenerative leaflets with posterior (P2 scallop) leaflet, flail gap approx. 7.9mm, flail width approx 13.8 mm.    Echo 10/5/23: LVEF 60-65%, LVEDD 5.86cm, IVSd 0.94cm, PWd 1.01, severe TR, severe MR, normal RV function.  [de-identified] : [facility] RA 8, RV 53/7, Pa 54/24/35, PCWP 18/19/16. Pa sat 75 CO 10.8 CI 5.7 (may be overestimated due to shunt) [facility] 8/24/23: RA 3, RV 30/1, PA 32/13 (20), PCWP 9, PA sat 64.4%, CO/CI 4.62/2.49, PVR 2.38. [facility] 8/24/23: non obstructive CAD.  [de-identified] : 11/17/23: EDMUND w/ Mitraclip x 2 (1 Xtw 1 XTr)

## 2025-04-30 NOTE — PHYSICAL EXAM
[No Rub] : no rub [No Gallop] : no gallop [Murmur] : murmur [No Edema] : no edema [Normal] : alert and oriented, normal memory [de-identified] : frail elderly  [de-identified] : JVP elevated to ~ 8-10 with v waves  [de-identified] : systolic murmur present  [de-identified] : irreg irreg rhythm; grade III/VI systolic murmur in L axilla

## 2025-05-29 NOTE — PHYSICAL EXAM
[Well Developed] : well developed [Well Nourished] : well nourished [No Acute Distress] : no acute distress [Normal Conjunctiva] : normal conjunctiva [Normal Venous Pressure] : normal venous pressure [No Carotid Bruit] : no carotid bruit [Normal S1, S2] : normal S1, S2 [No Rub] : no rub [No Gallop] : no gallop [Clear Lung Fields] : clear lung fields [Good Air Entry] : good air entry [No Respiratory Distress] : no respiratory distress  [Soft] : abdomen soft [Non Tender] : non-tender [No Masses/organomegaly] : no masses/organomegaly [Normal Bowel Sounds] : normal bowel sounds [Normal Gait] : normal gait [No Edema] : no edema [No Cyanosis] : no cyanosis [No Clubbing] : no clubbing [No Varicosities] : no varicosities [No Rash] : no rash [No Skin Lesions] : no skin lesions [Moves all extremities] : moves all extremities [No Focal Deficits] : no focal deficits [Normal Speech] : normal speech [Alert and Oriented] : alert and oriented [Normal memory] : normal memory [de-identified] : 6/6 holosystolic murmur radiating to apex.

## 2025-05-29 NOTE — HISTORY OF PRESENT ILLNESS
[FreeTextEntry1] : [id number] YUDELKA STATON Sep 1946 [phone]  76 y/o F with  *NSVT-30 beats-s/p ILR Mar '25 *severe MR s/p MItraClip x2 (23; 1 Xtw 1 XTr) with residual severe MR *severe TR, *iatrogenic ASD  *HFpEF s/p cardiomems, *AFib  here for followup. over past couple of weeks some dyspnea w/ occasional intermittent heaviness. wts in past 2 weeks have been somewhat elevated. wts if >147 pds TAQUERIA Hodges advised to take extra bumex normal takes 2 mg tab daily if elevated wts will  take an extra 2 mg. took an extra dose for 2 days 2 weeks. then took extra dose toeday. This is average for her takes extra dose about every month.  PA diastolics over past 30-. Discussed w/ Kavita - w/ PA diastolics in - pt gets hypotensive. so goal PAD is higher.  overall dyspnea is unchanged but has been more fatigued.  bumex 2 mg daily extra dose 2 mg for wts >147 dig 0.125  daily eliquis 5 BID jardiance 10 mg daily metoprolol suc ER 25 daily  crestor 20 qhs fiorella 25 dliay.  ========================================== PRIOR CARDIAC TESTING:  EC24 - afib, HR 94, low limb voltage, nl RWP Echo: TTE 24: EF 63%, LVIDD 5.8 LA severely dilated, RA severely dilated, Severe intervalvular MR, Severe TR, PASP 71 IVC 2.9  TTE 23: EF 60%, LVEDd 4.7, mitral clip with Severe MR, Severe TR, severe pHTN (PASP 110), severe biatrial enlargement, atrial septal defect (likely iatrogenic) with L to R flow, IVC 2.5 cm (not collapsible)  TTE 23: EF 60%, LVEDd 3.6 Severely dilated LA, Severely dilated RA, RV normal structure and function, ASD present, Severe MR Severe TR RVSP 66  LISE 10/10/23: LVEF 60-65%, severe LAE, mild RA, PASP 44mmHg, severe MR degenerative leaflets with posterior (P2 scallop) leaflet, flail gap approx. 7.9mm, flail width approx 13.8 mm.  Echo 10/5/23: LVEF 60-65%, LVEDD 5.86cm, IVSd 0.94cm, PWd 1.01, severe TR, severe MR, normal RV function.  Cardiac Cath/PCI: [facility] 23: RA 8, RV 53/7, Pa 54/24/35,  PCWP 18/19/16. Pa sat 75 CO 10.8 CI 5.7 (may be overestimated due to shunt) [facility] 23: RA 3, RV 30/1, PA 32/13 (20), PCWP 9, PA sat 64.4%, CO/CI 4.62/2.49, PVR 2.38.  [facility] 23: non obstructive CAD.  *****Cath 03/10/2025: normal coronaries, hypercontractile LV w/ severe MR  Cardiac Sur23: EDMUND w/ Mitraclip x 2 (1 Xtw 1 XTr) ========================================= =========================================

## 2025-05-29 NOTE — ASSESSMENT
[FreeTextEntry1] : A/P: ............................... *NSVT-30 beats-s/p ILR Mar '25 -[facility] Mar '25 no obstructive CAD. ILR placed. -no symptoms since d/c -EST Mar '25  w/ increase in PVCs but no symptoms. ............................... *severe MR s/p MItraClip x2 (11/17/23; 1 Xtw 1 XTr) with residual severe MR *severe TR *iatrogenic ASD -euvolemic on exam today -reports intermittent fatigue. -Discussed w/ TAQUERIA Hodges & pt today evaluation for pulmonary hypertension. Willing to see Dr. DOREEN Sutherland for further evaluation as she is relatively close. -as per structural heart team no plans for repeat intervention ............................... *HFpEF s/p cardiomems, *pulmonary hypertension-multifactorial. -euvolemic -cont. GDMT: BB, jardiance, fiorella. Cannot tolerate entresto and losartan due to symptomatic hypotension. -cardiomems monitored by HF service TAQUERIA Hodges. goal PAD 30s as has hypotension w/ PADs 20-25 range. ............................... *AFib -permanent -CHADS2-VASc=3 (>75, HF) -rate controlled.  -Cont. BB, eliquis. ...............................  Return to clinic in  Aug '25.

## 2025-06-13 NOTE — CARDIOLOGY SUMMARY
[de-identified] : 1/30/24 - afib, HR 94, low limb voltage, nl RWP [de-identified] : TTE 3/4/25:  EF 70%, severe MR, severe TR PASP 80 IVC 1.4   TTE 6/4/24:  EF 63%, LVIDD 5.8 LA severely dilated, RA severely dilated, Severe intervalvular MR, Severe TR, PASP 71 IVC 2.9  TTE 12/22/23: EF 60%, LVEDd 4.7, mitral clip with Severe MR, Severe TR, severe pHTN (PASP 110), severe biatrial enlargement, atrial septal defect (likely iatrogenic) with L to R flow, IVC 2.5 cm (not collapsible)  TTE 12/12/23: EF 60%, LVEDd 3.6 Severely dilated LA, Severely dilated RA, RV normal structure and function, ASD present, Severe MR Severe TR RVSP 66  LISE 10/10/23: LVEF 60-65%, severe LAE, mild RA, PASP 44mmHg, severe MR degenerative leaflets with posterior (P2 scallop) leaflet, flail gap approx. 7.9mm, flail width approx 13.8 mm.    Echo 10/5/23: LVEF 60-65%, LVEDD 5.86cm, IVSd 0.94cm, PWd 1.01, severe TR, severe MR, normal RV function.  [de-identified] : [facility] RA 8, RV 53/7, Pa 54/24/35, PCWP 18/19/16. Pa sat 75 CO 10.8 CI 5.7 (may be overestimated due to shunt) [facility] 8/24/23: RA 3, RV 30/1, PA 32/13 (20), PCWP 9, PA sat 64.4%, CO/CI 4.62/2.49, PVR 2.38. [facility] 8/24/23: non obstructive CAD.  [de-identified] : 11/17/23: EDMUND w/ Mitraclip x 2 (1 Xtw 1 XTr)

## 2025-06-13 NOTE — PHYSICAL EXAM
[No Rub] : no rub [No Gallop] : no gallop [Murmur] : murmur [No Edema] : no edema [Normal] : alert and oriented, normal memory [de-identified] : frail elderly  [de-identified] : JVP elevated to ~ 8-10 with v waves  [de-identified] : systolic murmur present  [de-identified] : irreg irreg rhythm; grade III/VI systolic murmur in L axilla

## 2025-06-13 NOTE — HISTORY OF PRESENT ILLNESS
[FreeTextEntry1] : Ms. Dye is a 77 yo F with h/o HFpEF (EF 60%, LVEDd 4.7) s/p CardioMEMs, severe MR s/p MItraClip x2 (11/17/23; 1 Xtw 1 XTr) with residual severe MR, AFib (on AC), severe TR, iatrogenic ASD who presents for follow up.  Please see office note form 5/17/24 for full iniitial details.   She was last seen on 3/21/25 feeling well, was started on losartan for afterload reduction.  She has remained well with continuing to walk with fatigue after > 1 block.  She is looking forward to going back to cardiac rehab.    Her weight has remained [id number] lbs and SBPs of .   She takes an additional 2 mg po of bumex if her weight is above 147, which she has taken occasionally. She denies any syncope, LH/dizziness, chest pain, palpitations, PND, orthopnea, nausea/vomiting, abdominal pain. Denies bendopnea but does get lightheaded. Uses cane for stability.   more fatigued with occasional lightheadedness   wt 144-146  bp normal  increase fluid to 60 oz and follow up  2 month f/u  seeing baldemar in 2 weeks

## 2025-06-13 NOTE — ASSESSMENT
[FreeTextEntry1] : Ms. Dye is a 77 yo F with h/o HFpEF (EF 60%, LVEDd 4.7) s/p CardioMEMs, severe MR s/p MItraClip x2 (11/17/23; 1 Xtw 1 XTr) with residual severe MR, AFib (on AC), severe TR, iatrogenic ASD who presents for follow up. She is ACC/AHA Stage C endorsing NYHA class II symptoms and is near euvolemic on exam    1. HFpEF   - Continue Bumex 2 mg po daily  - Continue Spironolactone 25 mg po daily  - Continue Jardiance 10 mg po daily  - had been symptomatically hypotensive with entresto and losartan  - will monitor PA pressure via cardiomems, goal likely 22-24   2. severe MR - s/p clip 11/2023 with persistent severe MR - plan as above - per structural, no plan for repat intervention at this time  3. severe TR - Severe TR  - symptoms have improved but will monitor with echos   4. Afib - poorly rate controlled - on Eliquis 5 mg twice/day - on toprol 12.5 mg po at bedtime - on dig 0.125 mg QOD   5. VT - S/p ICD   RTC PA in 2 months,  Dr. Stephen TBD

## 2025-06-13 NOTE — PHYSICAL EXAM
[No Rub] : no rub [No Gallop] : no gallop [Murmur] : murmur [No Edema] : no edema [Normal] : alert and oriented, normal memory [de-identified] : frail elderly  [de-identified] : JVP elevated to ~ 8-10 with v waves  [de-identified] : systolic murmur present  [de-identified] : irreg irreg rhythm; grade III/VI systolic murmur in L axilla

## 2025-06-13 NOTE — HISTORY OF PRESENT ILLNESS
[FreeTextEntry1] : Ms. Dye is a 77 yo F with h/o HFpEF (EF 60%, LVEDd 4.7) s/p CardioMEMs, severe MR s/p MItraClip x2 (11/17/23; 1 Xtw 1 XTr) with residual severe MR, AFib (on AC), severe TR, iatrogenic ASD who presents for follow up.  Please see office note form 5/17/24 for full iniitial details.   She was last seen on 3/21/25 feeling well, was started on losartan for afterload reduction.  She has remained well with continuing to walk with fatigue after > 1 block.  She is looking forward to going back to cardiac rehab.    Her weight has remained [id number] lbs and SBPs of .   She takes an additional 2 mg po of bumex if her weight is above 147, which she has taken occasionally. She denies any syncope, LH/dizziness, chest pain, palpitations, PND, orthopnea, nausea/vomiting, abdominal pain. Denies bendopnea but does get lightheaded. Uses cane for stability.   more fatigued with occasional lightheadedness   wt 144-146  bp normal  increase fluid to 60 oz and follow up  2 month f/u  seeing baldemar in 2 weeks

## 2025-06-13 NOTE — CARDIOLOGY SUMMARY
[de-identified] : 1/30/24 - afib, HR 94, low limb voltage, nl RWP [de-identified] : TTE 3/4/25:  EF 70%, severe MR, severe TR PASP 80 IVC 1.4   TTE 6/4/24:  EF 63%, LVIDD 5.8 LA severely dilated, RA severely dilated, Severe intervalvular MR, Severe TR, PASP 71 IVC 2.9  TTE 12/22/23: EF 60%, LVEDd 4.7, mitral clip with Severe MR, Severe TR, severe pHTN (PASP 110), severe biatrial enlargement, atrial septal defect (likely iatrogenic) with L to R flow, IVC 2.5 cm (not collapsible)  TTE 12/12/23: EF 60%, LVEDd 3.6 Severely dilated LA, Severely dilated RA, RV normal structure and function, ASD present, Severe MR Severe TR RVSP 66  LISE 10/10/23: LVEF 60-65%, severe LAE, mild RA, PASP 44mmHg, severe MR degenerative leaflets with posterior (P2 scallop) leaflet, flail gap approx. 7.9mm, flail width approx 13.8 mm.    Echo 10/5/23: LVEF 60-65%, LVEDD 5.86cm, IVSd 0.94cm, PWd 1.01, severe TR, severe MR, normal RV function.  [de-identified] : [facility] RA 8, RV 53/7, Pa 54/24/35, PCWP 18/19/16. Pa sat 75 CO 10.8 CI 5.7 (may be overestimated due to shunt) [facility] 8/24/23: RA 3, RV 30/1, PA 32/13 (20), PCWP 9, PA sat 64.4%, CO/CI 4.62/2.49, PVR 2.38. [facility] 8/24/23: non obstructive CAD.  [de-identified] : 11/17/23: EDMUND w/ Mitraclip x 2 (1 Xtw 1 XTr)

## 2025-06-29 NOTE — PHYSICAL EXAM
[Well Developed] : well developed [Well Nourished] : well nourished [No Acute Distress] : no acute distress [Normal Conjunctiva] : normal conjunctiva [Normal Venous Pressure] : normal venous pressure [No Carotid Bruit] : no carotid bruit [Normal S1, S2] : normal S1, S2 [No Rub] : no rub [No Gallop] : no gallop [Clear Lung Fields] : clear lung fields [Good Air Entry] : good air entry [No Respiratory Distress] : no respiratory distress  [Soft] : abdomen soft [Non Tender] : non-tender [No Masses/organomegaly] : no masses/organomegaly [Normal Bowel Sounds] : normal bowel sounds [Normal Gait] : normal gait [No Edema] : no edema [No Cyanosis] : no cyanosis [No Clubbing] : no clubbing [No Varicosities] : no varicosities [No Rash] : no rash [No Skin Lesions] : no skin lesions [Moves all extremities] : moves all extremities [No Focal Deficits] : no focal deficits [Normal Speech] : normal speech [Alert and Oriented] : alert and oriented [Normal memory] : normal memory [de-identified] : 6/6 holosystolic murmur radiating to apex.

## 2025-06-29 NOTE — ASSESSMENT
[FreeTextEntry1] : A/P  -Cont. current meds discussed changing bumex from 4 mg in AM to 2 mg in AM & 2 in afternoon.  return in July end of july. also scheduled w/ TAQUERIA miranda.

## 2025-06-29 NOTE — PHYSICAL EXAM
[Well Developed] : well developed [Well Nourished] : well nourished [No Acute Distress] : no acute distress [Normal Conjunctiva] : normal conjunctiva [Normal Venous Pressure] : normal venous pressure [No Carotid Bruit] : no carotid bruit [Normal S1, S2] : normal S1, S2 [No Rub] : no rub [No Gallop] : no gallop [Clear Lung Fields] : clear lung fields [Good Air Entry] : good air entry [No Respiratory Distress] : no respiratory distress  [Soft] : abdomen soft [Non Tender] : non-tender [No Masses/organomegaly] : no masses/organomegaly [Normal Bowel Sounds] : normal bowel sounds [Normal Gait] : normal gait [No Edema] : no edema [No Cyanosis] : no cyanosis [No Clubbing] : no clubbing [No Varicosities] : no varicosities [No Rash] : no rash [No Skin Lesions] : no skin lesions [Moves all extremities] : moves all extremities [No Focal Deficits] : no focal deficits [Normal Speech] : normal speech [Alert and Oriented] : alert and oriented [Normal memory] : normal memory [de-identified] : 6/6 holosystolic murmur radiating to apex.

## 2025-06-29 NOTE — HISTORY OF PRESENT ILLNESS
[FreeTextEntry1] : [id number] YUDELKA STATON Sep 1946 [phone]  77 y/o F with  *NSVT-30 beats-s/p ILR Mar '25 *severe MR s/p MItraClip x2 (23; 1 Xtw 1 XTr) with residual severe MR *severe TR, *iatrogenic ASD  *HFpEF s/p cardiomems, *Afib-permanent  here for hospital follow. Reviewed symptoms prior to admit: She reports she had gradually worsening dyspnea Since admit when long NSVT noted at cardiac rehab in Mar '25. Has slowly gotten worse - went to rehab & couldn't do exercises Called camille KIRBY PA & was told to go to ed see review of HIE records listed below. ............................................ Reviewed cath data w/ Dr. Levine during her admit: LHC & RHC:  PAD was normal PCWP, LVEDP was normal. 2:1 Left to right shunt PASP were elevated.  Julianna Young believes elevated pulmonary pressures are due to high flow across pulmonary system NOT eisenmonger's physiology pulmonary resistances were normal (?)  He said the elevated pulmonary pressures would make her a poor candidate for surgery even at [facility] - surgeons get nervous re: elevated pulmonary pressures.  He is concerned about closing ASD PCWP/LVEDP pressures are normal now if close ASD will potentially worsen PCWP/LVEDP.  ASD was a result of mitraclip procedure. Generally ASDs close after mitraclip but in some cases if flow is high (??) they persist  reviewed pt w/ TAQUERIA KIRBY PA: Discussed causes of pul HTN other than high flow. Of note premitraclip severe pul HTN noted assumed 2/2 MR PASP was 70s pre remained 70s postprocedure on echo pulmonary valve normal.  Reviewed cardiomems interrogation 24 hrs postcath. PASP 71, PA diastolic pressure 27. This is similar to cath measurements  PASP 80, PA diastolic pressure 23. cardiomems is accurate  1 week prior to admit PADP 37  likely due fluid overload may also variation in how it was taken at home.   ========================================== ========================================== PRIOR CARDIAC TESTING:  EC24 - afib, HR 94, low limb voltage, nl RWP Echo: TTE 24: EF 63%, LVIDD 5.8 LA severely dilated, RA severely dilated, Severe intervalvular MR, Severe TR, PASP 71 IVC 2.9  TTE 23: EF 60%, LVEDd 4.7, mitral clip with Severe MR, Severe TR, severe pHTN (PASP 110), severe biatrial enlargement, atrial septal defect (likely iatrogenic) with L to R flow, IVC 2.5 cm (not collapsible)  TTE 23: EF 60%, LVEDd 3.6 Severely dilated LA, Severely dilated RA, RV normal structure and function, ASD present, Severe MR Severe TR RVSP 66  LISE 10/10/23: LVEF 60-65%, severe LAE, mild RA, PASP 44mmHg, severe MR degenerative leaflets with posterior (P2 scallop) leaflet, flail gap approx. 7.9mm, flail width approx 13.8 mm.  Echo 10/5/23: LVEF 60-65%, LVEDD 5.86cm, IVSd 0.94cm, PWd 1.01, severe TR, severe MR, normal RV function.  Cardiac Cath/PCI: [facility] 23: RA 8, RV 53/7, Pa 54/24/35,  PCWP 18/19/16. Pa sat 75 CO 10.8 CI 5.7 (may be overestimated due to shunt) [facility] 23: RA 3, RV 30/1, PA 32/13 (20), PCWP 9, PA sat 64.4%, CO/CI 4.62/2.49, PVR 2.38.  [facility] 23: non obstructive CAD.  *****Cath 03/10/2025: normal coronaries, hypercontractile LV w/ severe MR  Cardiac Sur23: EDMUND w/ Mitraclip x 2 (1 Xtw 1 XTr) ========================================= ========================================= Yudelka Staton Female 1946 78 Yrs                                          	 Discharge Note Provider *********  Hospital Course: Discharge Date 2025 Admission Date 2025 21:12 Reason for Admission Acute on chronic HFpEF Severe MR, Torrential TR Atrial septal defect with left to right shunting SOB/DELGADO Hospital Course  Physical Exam: ********* HR: 74 (2025 12:11) (54 - 76) BP: 106/78 (2025 12:11) (94/54 - 122/92) RR: 18 (2025 12:11) (16 - 18) SpO2: 95% (2025 12:11) (93% - 97%)  *********  < from: TTE W or WO Ultrasound Enhancing Agent (25 @ 13:42) > CONCLUSIONS: 1. Percutaneous [id number] mitral repair device with a Mitraclip. There is severe mitral regurgitation. 2. Torrential tricuspid regurgitation. 3. Estimated pulmonary artery systolic pressure is 91 mmHg, consistent with severe pulmonary hypertension. 4. 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 5. Left ventricular systolic function is normal with an ejection fraction of 73 % by Wiggins's method of disks. 6. Left atrium is severely dilated. 7. The right atrium is severely dilated.  Assessment/Plan: 78F with PMHx of HTN, chronic afib (on Eliquis), HFpEF, severe MR despite mitraclip, torrential TR, s/p cardiomems, hx VT and loop recorder, CKD, severe pHTN admitted for diastolic CHF exacerbation.  #Acute on chronic HFpEF #Severe Pulmonary HTN #Severe MR despite mitraclip, torrential TR - Repeat TTE as above *********- 2 mg IV Bumex BID, and Aldactone qhs -> d/c on PO bumex 2MG BID per cardiology - s/p RHC revealing elevated pressures PAP80/23/42, PCWP 18, ASD with left to right shunting - Structural heart team following - plan for op follow up with Dr Levine for ASD closure - Daily weights I&Os - Cardiology consult apprecaited  #CKD -Baseline Cr 1.2-1.4 -Appears to be baseline, outpatient f/u  #Chronic Afib -Rate controlled. -C/w digoxin, BB and eliquis   Dispo: discharge to home in stable condition  ......  Total time spent: >80 min  ....................................  Discharge Medications   ......apixaban 5 mg oral tablet: 1 tab(s) orally every 12 hours ......bumetanide 2 mg oral tablet: 1 tab(s) orally 2 times a day Centrum Silver Women's oral tablet: 1 tab(s) orally once a day ......digoxin 125 mcg (0.125 mg) oral tablet: 1 tab(s) orally every other day ferrous sulfate 325 mg (65 mg elemental iron) oral tablet: 1 tab(s) orally once a day (in the afternoon) ......Jardiance 10 mg oral tablet: 1 tab(s) orally once a day (in the afternoon) ......Metoprolol Succinate ER 25 mg oral tablet, extended release: 0.5 tab(s) orally once a day (at bedtime) ......rosuvastatin 20 mg oral tablet: 1 tab(s) orally once a day (in the afternoon) ......spironolactone 25 mg oral tablet: 1 tab(s) orally once a day (in the afternoon) ....................................  Increase home Bumex to 2mg twice a day *** Follow up outpatient with interventional Cardiologist Dr. Levine, general cardiology and heart failure specialist HOLDEN Hodges   ....................................  [facility] CARDIOLOGY [address] Scheduled Appointment: 2025  Kenna Sutherland [facility] ....................................  [facility] ELECTROPH [address] ....................................  ....................................   Best Family or Patient Contact (if needed):  Additional Information about above appointments (if needed):  1: Interventional cardiology Dr. Levine in 1 week 2: Advanced Heart failure HOLDEN Hodges ********* Electronic Signatures: Shantel Headley (Access Services) (Signed 2025 16:33) Authored: Follow Up Cristino Medeiros () (Signed 2025 15:22) Co-Signer: Hospital Course, Med Reconciliation, Care Plan/Procedures, Follow Up, Quality Measures, Document Complete Eliseo Conner) (Signed 2025 15:10) Authored: Hospital Course, Med Reconciliation, Care Plan/Procedures, Follow Up, Quality Measures, Document Complete   Last Updated: 2025 16:33 by Shantel Headley (Access Services)

## 2025-06-29 NOTE — HISTORY OF PRESENT ILLNESS
[FreeTextEntry1] : [id number] YUDELKA STATON Sep 1946 [phone]  79 y/o F with  *NSVT-30 beats-s/p ILR Mar '25 *severe MR s/p MItraClip x2 (23; 1 Xtw 1 XTr) with residual severe MR *severe TR, *iatrogenic ASD  *HFpEF s/p cardiomems, *Afib-permanent  here for hospital follow. Reviewed symptoms prior to admit: She reports she had gradually worsening dyspnea Since admit when long NSVT noted at cardiac rehab in Mar '25. Has slowly gotten worse - went to rehab & couldn't do exercises Called camille KIRBY PA & was told to go to ed see review of HIE records listed below. ............................................ Reviewed cath data w/ Dr. Levine during her admit: LHC & RHC:  PAD was normal PCWP, LVEDP was normal. 2:1 Left to right shunt PASP were elevated.  Julianna Young believes elevated pulmonary pressures are due to high flow across pulmonary system NOT eisenmonger's physiology pulmonary resistances were normal (?)  He said the elevated pulmonary pressures would make her a poor candidate for surgery even at [facility] - surgeons get nervous re: elevated pulmonary pressures.  He is concerned about closing ASD PCWP/LVEDP pressures are normal now if close ASD will potentially worsen PCWP/LVEDP.  ASD was a result of mitraclip procedure. Generally ASDs close after mitraclip but in some cases if flow is high (??) they persist  reviewed pt w/ TAQUERIA KIRBY PA: Discussed causes of pul HTN other than high flow. Of note premitraclip severe pul HTN noted assumed 2/2 MR PASP was 70s pre remained 70s postprocedure on echo pulmonary valve normal.  Reviewed cardiomems interrogation 24 hrs postcath. PASP 71, PA diastolic pressure 27. This is similar to cath measurements  PASP 80, PA diastolic pressure 23. cardiomems is accurate  1 week prior to admit PADP 37  likely due fluid overload may also variation in how it was taken at home.   ========================================== ========================================== PRIOR CARDIAC TESTING:  EC24 - afib, HR 94, low limb voltage, nl RWP Echo: TTE 24: EF 63%, LVIDD 5.8 LA severely dilated, RA severely dilated, Severe intervalvular MR, Severe TR, PASP 71 IVC 2.9  TTE 23: EF 60%, LVEDd 4.7, mitral clip with Severe MR, Severe TR, severe pHTN (PASP 110), severe biatrial enlargement, atrial septal defect (likely iatrogenic) with L to R flow, IVC 2.5 cm (not collapsible)  TTE 23: EF 60%, LVEDd 3.6 Severely dilated LA, Severely dilated RA, RV normal structure and function, ASD present, Severe MR Severe TR RVSP 66  LISE 10/10/23: LVEF 60-65%, severe LAE, mild RA, PASP 44mmHg, severe MR degenerative leaflets with posterior (P2 scallop) leaflet, flail gap approx. 7.9mm, flail width approx 13.8 mm.  Echo 10/5/23: LVEF 60-65%, LVEDD 5.86cm, IVSd 0.94cm, PWd 1.01, severe TR, severe MR, normal RV function.  Cardiac Cath/PCI: [facility] 23: RA 8, RV 53/7, Pa 54/24/35,  PCWP 18/19/16. Pa sat 75 CO 10.8 CI 5.7 (may be overestimated due to shunt) [facility] 23: RA 3, RV 30/1, PA 32/13 (20), PCWP 9, PA sat 64.4%, CO/CI 4.62/2.49, PVR 2.38.  [facility] 23: non obstructive CAD.  *****Cath 03/10/2025: normal coronaries, hypercontractile LV w/ severe MR  Cardiac Sur23: EDMUND w/ Mitraclip x 2 (1 Xtw 1 XTr) ========================================= ========================================= Yudelka Staton Female 1946 78 Yrs                                          	 Discharge Note Provider *********  Hospital Course: Discharge Date 2025 Admission Date 2025 21:12 Reason for Admission Acute on chronic HFpEF Severe MR, Torrential TR Atrial septal defect with left to right shunting SOB/DELGADO Hospital Course  Physical Exam: ********* HR: 74 (2025 12:11) (54 - 76) BP: 106/78 (2025 12:11) (94/54 - 122/92) RR: 18 (2025 12:11) (16 - 18) SpO2: 95% (2025 12:11) (93% - 97%)  *********  < from: TTE W or WO Ultrasound Enhancing Agent (25 @ 13:42) > CONCLUSIONS: 1. Percutaneous mqzc-fl-gkxb mitral repair device with a Mitraclip. There is severe mitral regurgitation. 2. Torrential tricuspid regurgitation. 3. Estimated pulmonary artery systolic pressure is 91 mmHg, consistent with severe pulmonary hypertension. 4. 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 5. Left ventricular systolic function is normal with an ejection fraction of 73 % by Wiggins's method of disks. 6. Left atrium is severely dilated. 7. The right atrium is severely dilated.  Assessment/Plan: 78F with PMHx of HTN, chronic afib (on Eliquis), HFpEF, severe MR despite mitraclip, torrential TR, s/p cardiomems, hx VT and loop recorder, CKD, severe pHTN admitted for diastolic CHF exacerbation.  #Acute on chronic HFpEF #Severe Pulmonary HTN #Severe MR despite mitraclip, torrential TR - Repeat TTE as above *********- 2 mg IV Bumex BID, and Aldactone qhs -> d/c on PO bumex 2MG BID per cardiology - s/p RHC revealing elevated pressures PAP80/23/42, PCWP 18, ASD with left to right shunting - Structural heart team following - plan for op follow up with Dr Levine for ASD closure - Daily weights I&Os - Cardiology consult apprecaited  #CKD -Baseline Cr 1.2-1.4 -Appears to be baseline, outpatient f/u  #Chronic Afib -Rate controlled. -C/w digoxin, BB and eliquis   Dispo: discharge to home in stable condition  ......  Total time spent: >80 min  ....................................  Discharge Medications   ......apixaban 5 mg oral tablet: 1 tab(s) orally every 12 hours ......bumetanide 2 mg oral tablet: 1 tab(s) orally 2 times a day Centrum Silver Women's oral tablet: 1 tab(s) orally once a day ......digoxin 125 mcg (0.125 mg) oral tablet: 1 tab(s) orally every other day ferrous sulfate 325 mg (65 mg elemental iron) oral tablet: 1 tab(s) orally once a day (in the afternoon) ......Jardiance 10 mg oral tablet: 1 tab(s) orally once a day (in the afternoon) ......Metoprolol Succinate ER 25 mg oral tablet, extended release: 0.5 tab(s) orally once a day (at bedtime) ......rosuvastatin 20 mg oral tablet: 1 tab(s) orally once a day (in the afternoon) ......spironolactone 25 mg oral tablet: 1 tab(s) orally once a day (in the afternoon) ....................................  Increase home Bumex to 2mg twice a day *** Follow up outpatient with interventional Cardiologist Dr. Levine, general cardiology and heart failure specialist HOLDEN Hodges   ....................................  [facility] CARDIOLOGY [address] Scheduled Appointment: 2025  Kenna Sutherland [facility] ....................................  [facility] ELECTROPH [address] ....................................  ....................................   Best Family or Patient Contact (if needed):  Additional Information about above appointments (if needed):  1: Interventional cardiology Dr. Levine in 1 week 2: Advanced Heart failure HOLDEN Hodges ********* Electronic Signatures: Shantel Headley (Access Services) (Signed 2025 16:33) Authored: Follow Up Cristino Medeiros () (Signed 2025 15:22) Co-Signer: Hospital Course, Med Reconciliation, Care Plan/Procedures, Follow Up, Quality Measures, Document Complete Eliseo Conner) (Signed 2025 15:10) Authored: Hospital Course, Med Reconciliation, Care Plan/Procedures, Follow Up, Quality Measures, Document Complete   Last Updated: 2025 16:33 by Shantel Headley (Access Services)

## 2025-07-01 NOTE — REASON FOR VISIT
[Cardiac Failure] : cardiac failure [Structural Heart and Valve Disease] : structural heart and valve disease [FreeTextEntry1] : Ref:  Reji

## 2025-07-01 NOTE — PHYSICAL EXAM
[No Rub] : no rub [No Gallop] : no gallop [Murmur] : murmur [No Edema] : no edema [Normal] : no edema, no cyanosis, no clubbing, no varicosities [de-identified] : frail elderly  [de-identified] : JVP elevated to ~ 8-10 with v waves  [de-identified] : systolic murmur present  [de-identified] : irreg irreg rhythm; grade III/VI systolic murmur in L axilla

## 2025-07-01 NOTE — PHYSICAL EXAM
[No Rub] : no rub [No Gallop] : no gallop [Murmur] : murmur [No Edema] : no edema [Normal] : no edema, no cyanosis, no clubbing, no varicosities [de-identified] : frail elderly  [de-identified] : JVP elevated to ~ 8-10 with v waves  [de-identified] : systolic murmur present  [de-identified] : irreg irreg rhythm; grade III/VI systolic murmur in L axilla

## 2025-07-01 NOTE — HISTORY OF PRESENT ILLNESS
[FreeTextEntry1] : Ms. Dye is a 79 yo F with h/o HFpEF (EF 60%, LVEDd 4.7) s/p CardioMEMs, severe MR s/p MItraClip x2 (11/17/23; 1 Xtw 1 XTr) with residual severe MR, AFib (on AC), severe TR, iatrogenic ASD who presents for follow up.  Please see office note form 5/17/24 for full iniitial details.   She was last seen on 6/13/25 with continue fatigue believed to be due to dehydration and was instructed to slightly inceas fluids.  The following week her fatigue worsened and she presented to the ED where er MR was again noted to be severe.  She underwent a RHC with significantly elevated PA pressures, and ASD with QP/QS showing L->R shunt.  She improved with increased bumex to 2 mg po BID and was discharged home.   She reports less fatigue since the increased bumex dosing.  She has decreased the intensity of her workouts at cardiac rehab.    Her weight has been 138-142 lbs and SBPs of 90-100s.

## 2025-07-01 NOTE — CARDIOLOGY SUMMARY
[de-identified] : 1/30/24 - afib, HR 94, low limb voltage, nl RWP [de-identified] : TTE 6/17/25:  EF 75% LVIDd 5.9 IVS 1 PWd 0.9 RV enlarged with borderline function TAPSE 1.8 LA severely dilated, RA severely dilated, Mitraclip present with severe MR MV PG/MG 25/10, Torrential TR PASP 91, PFO with bidirectional shunting, IVC 2.36  TTE 3/4/25:  EF 70%, severe MR, severe TR PASP 80 IVC 1.4   TTE 6/4/24:  EF 63%, LVIDD 5.8 LA severely dilated, RA severely dilated, Severe intervalvular MR, Severe TR, PASP 71 IVC 2.9  TTE 12/22/23: EF 60%, LVEDd 4.7, mitral clip with Severe MR, Severe TR, severe pHTN (PASP 110), severe biatrial enlargement, atrial septal defect (likely iatrogenic) with L to R flow, IVC 2.5 cm (not collapsible)  TTE 12/12/23: EF 60%, LVEDd 3.6 Severely dilated LA, Severely dilated RA, RV normal structure and function, ASD present, Severe MR Severe TR RVSP 66  LISE 10/10/23: LVEF 60-65%, severe LAE, mild RA, PASP 44mmHg, severe MR degenerative leaflets with posterior (P2 scallop) leaflet, flail gap approx. 7.9mm, flail width approx 13.8 mm.    Echo 10/5/23: LVEF 60-65%, LVEDD 5.86cm, IVSd 0.94cm, PWd 1.01, severe TR, severe MR, normal RV function.  [de-identified] : [facility] 6/19/25:  RA 9 RV 33/11 PA 80/23 (42) PCWP 18 PA Sat 77 CO 4.75 CI 2.67  SVR 1380 PVR 2.35 STOKES, QP/QS 2.06  Cath 3/10/25:  Non-ob CAD  [facility] 12/20/23: RA 8, RV 53/7, Pa 54/24/35, PCWP 18/19/16. Pa sat 75 CO 10.8 CI 5.7 (may be overestimated due to shunt) [facility] 8/24/23: RA 3, RV 30/1, PA 32/13 (20), PCWP 9, PA sat 64.4%, CO/CI 4.62/2.49, PVR 2.38. [facility] 8/24/23: non obstructive CAD.  [de-identified] : 11/17/23: EDMUND w/ Mitraclip x 2 (1 Xtw 1 XTr)

## 2025-07-01 NOTE — CARDIOLOGY SUMMARY
[de-identified] : 1/30/24 - afib, HR 94, low limb voltage, nl RWP [de-identified] : TTE 6/17/25:  EF 75% LVIDd 5.9 IVS 1 PWd 0.9 RV enlarged with borderline function TAPSE 1.8 LA severely dilated, RA severely dilated, Mitraclip present with severe MR MV PG/MG 25/10, Torrential TR PASP 91, PFO with bidirectional shunting, IVC 2.36  TTE 3/4/25:  EF 70%, severe MR, severe TR PASP 80 IVC 1.4   TTE 6/4/24:  EF 63%, LVIDD 5.8 LA severely dilated, RA severely dilated, Severe intervalvular MR, Severe TR, PASP 71 IVC 2.9  TTE 12/22/23: EF 60%, LVEDd 4.7, mitral clip with Severe MR, Severe TR, severe pHTN (PASP 110), severe biatrial enlargement, atrial septal defect (likely iatrogenic) with L to R flow, IVC 2.5 cm (not collapsible)  TTE 12/12/23: EF 60%, LVEDd 3.6 Severely dilated LA, Severely dilated RA, RV normal structure and function, ASD present, Severe MR Severe TR RVSP 66  LISE 10/10/23: LVEF 60-65%, severe LAE, mild RA, PASP 44mmHg, severe MR degenerative leaflets with posterior (P2 scallop) leaflet, flail gap approx. 7.9mm, flail width approx 13.8 mm.    Echo 10/5/23: LVEF 60-65%, LVEDD 5.86cm, IVSd 0.94cm, PWd 1.01, severe TR, severe MR, normal RV function.  [de-identified] : [facility] 6/19/25:  RA 9 RV 33/11 PA 80/23 (42) PCWP 18 PA Sat 77 CO 4.75 CI 2.67  SVR 1380 PVR 2.35 STOKES, QP/QS 2.06  Cath 3/10/25:  Non-ob CAD  [facility] 12/20/23: RA 8, RV 53/7, Pa 54/24/35, PCWP 18/19/16. Pa sat 75 CO 10.8 CI 5.7 (may be overestimated due to shunt) [facility] 8/24/23: RA 3, RV 30/1, PA 32/13 (20), PCWP 9, PA sat 64.4%, CO/CI 4.62/2.49, PVR 2.38. [facility] 8/24/23: non obstructive CAD.  [de-identified] : 11/17/23: EDMUND w/ Mitraclip x 2 (1 Xtw 1 XTr)

## 2025-07-01 NOTE — ASSESSMENT
[FreeTextEntry1] : Ms. yDe is a 77 yo F with h/o HFpEF (EF 60%, LVEDd 4.7) s/p CardioMEMs, severe MR s/p MItraClip x2 (11/17/23; 1 Xtw 1 XTr) with residual severe MR, AFib (on AC), severe TR, iatrogenic ASD who presents for follow up. She is ACC/AHA Stage C endorsing NYHA class II/III symptoms and is near euvolemic on exam. She was recently hospitalized with RHC showing elevated PA pressure and ASD with shunt but improved after increased diuretics   1. HFpEF   - Continue Bumex 2 mg po BID - Continue Spironolactone 25 mg po daily  - Continue Jardiance 10 mg po daily  - had been symptomatically hypotensive with entresto and losartan  - will monitor PA pressure via cardiomems, goal likely ~ 25  2. pHTN - Likely Group II/III - has appt with pulm next week   3. severe MR/ TR  - s/p clip 11/2023 with persistent severe MR - plan as above - will discuss with Dr. Levine any potential treatments given worsening symptoms.  4. Afib - poorly rate controlled - on Eliquis 5 mg twice/day - on toprol 12.5 mg po at bedtime - on dig 0.125 mg QOD   5. VT - S/p ICD   RTC PA in 1 month

## 2025-07-01 NOTE — REASON FOR VISIT
Are you willing to cotter follow up with him after the PFT's are done?   [Cardiac Failure] : cardiac failure [Structural Heart and Valve Disease] : structural heart and valve disease [FreeTextEntry1] : Ref:  Reji

## 2025-07-01 NOTE — ASSESSMENT
[FreeTextEntry1] : Ms. Dye is a 77 yo F with h/o HFpEF (EF 60%, LVEDd 4.7) s/p CardioMEMs, severe MR s/p MItraClip x2 (11/17/23; 1 Xtw 1 XTr) with residual severe MR, AFib (on AC), severe TR, iatrogenic ASD who presents for follow up. She is ACC/AHA Stage C endorsing NYHA class II/III symptoms and is near euvolemic on exam. She was recently hospitalized with [facility] showing elevated PA pressure and ASD with shunt but improved after increased diuretics   1. HFpEF   - Continue Bumex 2 mg po BID - Continue Spironolactone 25 mg po daily  - Continue Jardiance 10 mg po daily  - had been symptomatically hypotensive with entresto and losartan  - will monitor PA pressure via cardiomems, goal likely ~ 25  2. pHTN - Likely Group II/III - has appt with pulm next week   3. severe MR/ TR  - s/p clip 11/2023 with persistent severe MR - plan as above - will discuss with Dr. Levine any potential treatments given worsening symptoms.  4. Afib - poorly rate controlled - on Eliquis 5 mg twice/day - on toprol 12.5 mg po at bedtime - on dig 0.125 mg QOD   5. VT - S/p ICD   RTC PA in 1 month

## 2025-07-07 NOTE — PHYSICAL EXAM
[Well Developed] : well developed [Well Nourished] : well nourished [No Acute Distress] : no acute distress [Normal Conjunctiva] : normal conjunctiva [Normal Venous Pressure] : normal venous pressure [No Carotid Bruit] : no carotid bruit [Normal S1, S2] : normal S1, S2 [No Rub] : no rub [No Gallop] : no gallop [Clear Lung Fields] : clear lung fields [Good Air Entry] : good air entry [No Respiratory Distress] : no respiratory distress  [Soft] : abdomen soft [Non Tender] : non-tender [No Masses/organomegaly] : no masses/organomegaly [Normal Bowel Sounds] : normal bowel sounds [Normal Gait] : normal gait [No Cyanosis] : no cyanosis [No Clubbing] : no clubbing [No Varicosities] : no varicosities [Edema ___] : edema [unfilled] [No Rash] : no rash [No Skin Lesions] : no skin lesions [Moves all extremities] : moves all extremities [No Focal Deficits] : no focal deficits [Normal Speech] : normal speech [Alert and Oriented] : alert and oriented [Normal memory] : normal memory [de-identified] : 3/6 systolic

## 2025-07-07 NOTE — DISCUSSION/SUMMARY
[FreeTextEntry1] : Patient with severe valvular heart disease and iatrogenic ASD wit left to right shunt.  she is currently doing well, able to walk 2 blocks with a cane and back at cardiac rehab.  her interventional options are limited and have the potential of worsening her heart failure symptoms.  Will discuss with the structural heart team.  [EKG obtained to assist in diagnosis and management of assessed problem(s)] : EKG obtained to assist in diagnosis and management of assessed problem(s)

## 2025-07-07 NOTE — HISTORY OF PRESENT ILLNESS
[FreeTextEntry1] : 78 year old woman who underwent unsuccessful MitraClip in 2023 for severe MR. She was wheelchair bound at that time.  With careful GDM she started cardiac rehab and is now able to walk with a cane.   she was recently hospitalized for dyspnea and hypotension.  Echo and right heart cath showed severe MR, severe TR and a left to right shunt of 2:1.